# Patient Record
Sex: MALE | Race: WHITE | Employment: FULL TIME | URBAN - METROPOLITAN AREA
[De-identification: names, ages, dates, MRNs, and addresses within clinical notes are randomized per-mention and may not be internally consistent; named-entity substitution may affect disease eponyms.]

---

## 2017-02-20 ENCOUNTER — OFFICE VISIT (OUTPATIENT)
Dept: CARDIOLOGY CLINIC | Age: 62
End: 2017-02-20

## 2017-02-20 VITALS
SYSTOLIC BLOOD PRESSURE: 122 MMHG | WEIGHT: 216 LBS | HEART RATE: 66 BPM | BODY MASS INDEX: 30.92 KG/M2 | HEIGHT: 70 IN | DIASTOLIC BLOOD PRESSURE: 76 MMHG

## 2017-02-20 DIAGNOSIS — I25.10 CORONARY ARTERY DISEASE INVOLVING NATIVE CORONARY ARTERY OF NATIVE HEART WITHOUT ANGINA PECTORIS: Primary | ICD-10-CM

## 2017-02-20 DIAGNOSIS — I21.02 ACUTE ST ELEVATION MYOCARDIAL INFARCTION (STEMI) INVOLVING LEFT ANTERIOR DESCENDING (LAD) CORONARY ARTERY (HCC): ICD-10-CM

## 2017-02-20 DIAGNOSIS — E78.2 MIXED HYPERLIPIDEMIA: ICD-10-CM

## 2017-02-20 DIAGNOSIS — I10 ESSENTIAL HYPERTENSION: ICD-10-CM

## 2017-02-20 DIAGNOSIS — Z98.61 HISTORY OF PTCA: ICD-10-CM

## 2017-02-20 DIAGNOSIS — I15.9 SECONDARY HYPERTENSION, UNSPECIFIED: ICD-10-CM

## 2017-02-20 PROCEDURE — 99213 OFFICE O/P EST LOW 20 MIN: CPT | Performed by: INTERNAL MEDICINE

## 2017-02-20 RX ORDER — CARVEDILOL 6.25 MG/1
6.25 TABLET ORAL 2 TIMES DAILY WITH MEALS
Qty: 180 TABLET | Refills: 3 | Status: SHIPPED | OUTPATIENT
Start: 2017-02-20 | End: 2017-09-18 | Stop reason: SDUPTHER

## 2017-02-20 RX ORDER — LISINOPRIL 2.5 MG/1
2.5 TABLET ORAL DAILY
Qty: 90 TABLET | Refills: 3 | Status: SHIPPED | OUTPATIENT
Start: 2017-02-20 | End: 2017-09-18 | Stop reason: SDUPTHER

## 2017-02-20 RX ORDER — SIMVASTATIN 40 MG
40 TABLET ORAL NIGHTLY
Qty: 90 TABLET | Refills: 3 | Status: SHIPPED | OUTPATIENT
Start: 2017-02-20 | End: 2017-09-18 | Stop reason: SDUPTHER

## 2017-09-18 ENCOUNTER — OFFICE VISIT (OUTPATIENT)
Dept: CARDIOLOGY CLINIC | Age: 62
End: 2017-09-18

## 2017-09-18 VITALS
BODY MASS INDEX: 30.58 KG/M2 | DIASTOLIC BLOOD PRESSURE: 72 MMHG | HEIGHT: 70 IN | SYSTOLIC BLOOD PRESSURE: 126 MMHG | WEIGHT: 213.6 LBS | HEART RATE: 72 BPM

## 2017-09-18 DIAGNOSIS — I10 ESSENTIAL HYPERTENSION: ICD-10-CM

## 2017-09-18 DIAGNOSIS — E78.2 MIXED HYPERLIPIDEMIA: ICD-10-CM

## 2017-09-18 DIAGNOSIS — I25.10 CORONARY ARTERY DISEASE INVOLVING NATIVE CORONARY ARTERY OF NATIVE HEART WITHOUT ANGINA PECTORIS: Primary | ICD-10-CM

## 2017-09-18 DIAGNOSIS — Z98.61 HISTORY OF PTCA: ICD-10-CM

## 2017-09-18 PROCEDURE — 99213 OFFICE O/P EST LOW 20 MIN: CPT | Performed by: INTERNAL MEDICINE

## 2017-09-18 RX ORDER — ASPIRIN 325 MG
325 TABLET ORAL DAILY
Qty: 90 TABLET | Refills: 3 | Status: SHIPPED | OUTPATIENT
Start: 2017-09-18 | End: 2022-07-01

## 2017-09-18 RX ORDER — NITROGLYCERIN 0.4 MG/1
0.4 TABLET SUBLINGUAL EVERY 5 MIN PRN
Qty: 25 TABLET | Refills: 3 | Status: SHIPPED | OUTPATIENT
Start: 2017-09-18 | End: 2018-06-25 | Stop reason: SDUPTHER

## 2017-09-18 RX ORDER — CARVEDILOL 6.25 MG/1
6.25 TABLET ORAL 2 TIMES DAILY WITH MEALS
Qty: 180 TABLET | Refills: 3 | Status: SHIPPED | OUTPATIENT
Start: 2017-09-18 | End: 2018-06-25 | Stop reason: SDUPTHER

## 2017-09-18 RX ORDER — SIMVASTATIN 40 MG
40 TABLET ORAL NIGHTLY
Qty: 90 TABLET | Refills: 3 | Status: SHIPPED | OUTPATIENT
Start: 2017-09-18 | End: 2018-06-25 | Stop reason: SDUPTHER

## 2017-09-18 RX ORDER — LISINOPRIL 2.5 MG/1
2.5 TABLET ORAL DAILY
Qty: 90 TABLET | Refills: 3 | Status: SHIPPED | OUTPATIENT
Start: 2017-09-18 | End: 2018-06-25 | Stop reason: SDUPTHER

## 2017-12-04 ENCOUNTER — PROCEDURE VISIT (OUTPATIENT)
Dept: CARDIOLOGY CLINIC | Age: 62
End: 2017-12-04

## 2017-12-04 DIAGNOSIS — E78.1 PURE HYPERGLYCERIDEMIA: ICD-10-CM

## 2017-12-04 DIAGNOSIS — I25.10 CORONARY ARTERY DISEASE INVOLVING NATIVE CORONARY ARTERY OF NATIVE HEART WITHOUT ANGINA PECTORIS: ICD-10-CM

## 2017-12-04 DIAGNOSIS — Z98.61 HISTORY OF PTCA: ICD-10-CM

## 2017-12-04 DIAGNOSIS — I10 ESSENTIAL HYPERTENSION: ICD-10-CM

## 2017-12-04 LAB
LV EF: 46 %
LVEF MODALITY: NORMAL

## 2017-12-04 PROCEDURE — 93017 CV STRESS TEST TRACING ONLY: CPT | Performed by: INTERNAL MEDICINE

## 2017-12-04 PROCEDURE — 93018 CV STRESS TEST I&R ONLY: CPT | Performed by: INTERNAL MEDICINE

## 2017-12-04 PROCEDURE — 78452 HT MUSCLE IMAGE SPECT MULT: CPT | Performed by: INTERNAL MEDICINE

## 2017-12-04 PROCEDURE — 93016 CV STRESS TEST SUPVJ ONLY: CPT | Performed by: INTERNAL MEDICINE

## 2017-12-04 PROCEDURE — A9500 TC99M SESTAMIBI: HCPCS | Performed by: INTERNAL MEDICINE

## 2017-12-06 ENCOUNTER — TELEPHONE (OUTPATIENT)
Dept: CARDIOLOGY CLINIC | Age: 62
End: 2017-12-06

## 2017-12-06 NOTE — TELEPHONE ENCOUNTER
Dodson Left message on voicemail with date and time for appt and if pt  Could not make it to call and left her know             Multi vessel disease pattern. LAD territory MI & Inferior wall Ischemia. Large area of Myocardium involved. Isacc Shaw Apical dyskinesia with mildly reduced LV function. LVEF is 46 %.

## 2017-12-12 ENCOUNTER — OFFICE VISIT (OUTPATIENT)
Dept: CARDIOLOGY CLINIC | Age: 62
End: 2017-12-12

## 2017-12-12 VITALS
DIASTOLIC BLOOD PRESSURE: 84 MMHG | WEIGHT: 216 LBS | HEART RATE: 70 BPM | BODY MASS INDEX: 30.92 KG/M2 | SYSTOLIC BLOOD PRESSURE: 132 MMHG | HEIGHT: 70 IN

## 2017-12-12 DIAGNOSIS — I10 ESSENTIAL HYPERTENSION: ICD-10-CM

## 2017-12-12 DIAGNOSIS — E78.2 MIXED HYPERLIPIDEMIA: ICD-10-CM

## 2017-12-12 DIAGNOSIS — Z98.61 HISTORY OF PTCA: ICD-10-CM

## 2017-12-12 DIAGNOSIS — I21.02 ACUTE ST ELEVATION MYOCARDIAL INFARCTION (STEMI) INVOLVING LEFT ANTERIOR DESCENDING (LAD) CORONARY ARTERY (HCC): ICD-10-CM

## 2017-12-12 DIAGNOSIS — I25.10 CORONARY ARTERY DISEASE INVOLVING NATIVE CORONARY ARTERY OF NATIVE HEART WITHOUT ANGINA PECTORIS: Primary | ICD-10-CM

## 2017-12-12 DIAGNOSIS — E11.51 TYPE 2 DIABETES MELLITUS WITH DIABETIC PERIPHERAL ANGIOPATHY WITHOUT GANGRENE, WITHOUT LONG-TERM CURRENT USE OF INSULIN (HCC): ICD-10-CM

## 2017-12-12 PROCEDURE — 99214 OFFICE O/P EST MOD 30 MIN: CPT | Performed by: INTERNAL MEDICINE

## 2017-12-12 RX ORDER — METFORMIN HYDROCHLORIDE 500 MG/1
TABLET, EXTENDED RELEASE ORAL
Refills: 3 | COMMUNITY
Start: 2017-11-28 | End: 2018-06-25

## 2017-12-12 RX ORDER — SITAGLIPTIN 100 MG/1
TABLET, FILM COATED ORAL
Refills: 3 | COMMUNITY
Start: 2017-11-28 | End: 2018-06-25

## 2017-12-12 NOTE — ADDENDUM NOTE
Encounter addended by:  Keyona Soriano on: 12/12/2017 10:19 AM<BR>    Actions taken: Letter status changed

## 2017-12-12 NOTE — ADDENDUM NOTE
Encounter addended by:  Delbert Bojorquez on: 12/12/2017  9:54 AM<BR>    Actions taken: Letter status changed

## 2017-12-12 NOTE — LETTER
? Use of pathology  ? I authorize [facility] ([facility]) to dispose of tissues, specimens or organs when pathology is complete. ? Use of radiology  ? A contrast agent may be required for radiology procedures. My practitioner has advised me of the risks of using, risks of not using, benefits, side effects, and alternatives. ? Observers or use of photography, video/audio recording, or televising of the procedure(s). This is for medical, scientific, or educational purposes. This includes appropriate portions of my body. My identity will not be revealed. ? I consent to release of my social security number and other identifying information to [employer] (FDA), and the supplier/, if I receive tissue, a device, or implant. This is to track the tissue, device, or implant for defect, recall, infection, etc.     ? Use of blood and/or blood products, if needed, through my hospital stay. My practitioner has advised me of the risks of using, risks of not using, benefits, side effects, and alternatives. ___ I do NOT want Blood or Blood products given. (Complete separate  refusal form)    Code Status (jennifer one):  ___ I do NOT HAVE a DNR order. I am a Full-code.   I will receive CPR, intubation,  chest compressions, medications, and/or other life saving measures if I have a  cardiac or respiratory arrest.    ___ I have a Do Not Resuscitate (DNR)order.   (jennifer one below)  ___  I rescind my DNR for surgery and immediate post-operative period through Phase 2 recovery. This means, for that time period, I will be a Full-code and receive CPR, intubation, chest compressions, medications, and/or other life saving measures, if I have a cardiac or respiratory arrest.    ___ I WANT to keep my DNR in effect during my procedure(s) and immediate post-operative recovery period through Phase 2 recovery.   (Complete separate refusal form) This form has been fully explained to me. I understand its contents. Patients Signature: ___________________________Date: ________  Time: ________    If patient unable to sign, has engaged the [address] [place] [address], is a minor, or has a court-appointed Guardian:  [facility] Representative Name (Print):  ____________________________________      Relationship ([place] one):    Guardian   Parent    Spouse    HCPOA   Child   Sibling  Next-of-Kin Friend    Patients Representative Signature: _______________________________________              Date: ______________  Time: __________    An  was used.  name/ID: _________________________________      [facility] ([facility] Witness________________________  Date: ________   Time: _________    Physician/Practitioner _______________________  Date: ________   Time: _________           Revision 2017                                                                                                      Ladonna Dakins Dr. Chauncy Lions TO SCHEDULE:    LEFT HEART CATHETERIZATION WITH POSSIBLE PERCUTANEOUS CORONARY INTERVENTION       Patient Name: Prince Sales.    : 1955   MRN# [id number]    Home Phone Number: [phone]   Weight:    Wt Readings from Last 3 Encounters:   17 216 lb (98 kg)   17 213 lb 9.6 oz (96.9 kg)   17 216 lb (98 kg)        Insurance: Payor: Yuliana Camar[place] / Plan: Noe Diggs HMO / Product Type: *No Product type* /     Date of Procedure: 17 Time: 7am Arrival Time: 545am    Diagnosis:  Abn stress test  Allergies: No Known Allergies     1) Call [facility] scheduling ([phone]) or Instant Message  CONFIRMED WITH     PHONE OR   INSTANT MESSAGE  2) PREAUTHORIZATION NUMBER:    Spoke to:      From date:     expiration date:         [place]

## 2017-12-12 NOTE — PROGRESS NOTES
disease)     H/O cardiovascular stress test 05/29/12 [facility]    05/29/12 [facility] mild global hypokinesis with mildly reducednLV function LVEF is 42%    H/O cardiovascular stress test 1/5/2014    cardiolite-scarbasal anteroseptal,midanteroseptal,apical anterior,apical septal,apical,septal and apicial. EF44% no ischemia    H/O cardiovascular stress test 12/1/2015    cardiolite-scar,no ischemia,EF52%    H/O echocardiogram 06/24/11 06/24/11 aortic root dimensions are normal, left atrium is borderline enlarged, mid left ventricular hypertrophy seen with significant hypokinesis of the anterior wall of the septum and apical portions with moderate to severe reduced left ventric. systolic function, EF is 17-75% mitral valve appears normal aortic valve is sclerotic but not stenotic, tricuspid valve is normal,    History of cardiovascular stress test 07/11/11 07/11/11 good exercise capacity, physiological BP response to exercise, ETT non-diagnostic EKG abnormalties    History of cardiovascular stress test 12/04/2017    Multi vessel disease pattern. LAD territory MI & Inferior wall Ischemia. Large area of Myocardium involved. Em Salvia Apical dyskinesia with mildly reduced LV function. LVEF is 46 %.  History of complete electrocardiogram 07/11/11 07/11/11 on chart    History of PTCA 6/22/2011    HTN (hypertension)     Hyperlipidemia      Past Surgical History:   Procedure Laterality Date    PTCA  06/22/11 06/22/11 mild circumflex and RCA disease, mid critical LAD disease with total occlusion in it's mid portion, normal systemic , but elevated left ventricular end diastolic pressure at rest.left ventriculography reveals significant wall motion abnormalities. with at least moderate reduction of left ventricular systolic function. no mitral regurg, successful percutaneous intervention LAD artery      As reviewed History reviewed. No pertinent family history.   Social History   Substance Use Topics    Smoking status: Former Cranial nerves II through XII are grossly intact. There were no gross focal neurologic abnormalities. Lab Review   Lab Results   Component Value Date    CKTOTAL 84 07/14/2011    CKMB <0.2 07/14/2011    TROPONINI 0.020 07/15/2011     BNP:    Lab Results   Component Value Date     07/14/2011     PT/INR:    Lab Results   Component Value Date    INR 1.13 07/14/2011     Lab Results   Component Value Date    LABA1C 6.2 06/18/2012     Lab Results   Component Value Date    WBC 7.3 02/01/2016    HCT 47.4 02/01/2016    MCV 92.0 07/14/2011     02/01/2016     Lab Results   Component Value Date    CHOL 131 02/01/2016    TRIG 122 02/01/2016    HDL 32 (A) 02/01/2016    LDLCALC 75 02/01/2016    LDLDIRECT 113 (H) 06/23/2011     Lab Results   Component Value Date    ALT 29 08/05/2013    AST 21 08/05/2013     BMP:    Lab Results   Component Value Date     08/05/2013    K 4.8 08/05/2013     08/05/2013    CO2 22 08/05/2013    BUN 19 08/05/2013    CREATININE 1.01 08/05/2013     CMP:   Lab Results   Component Value Date     08/05/2013    K 4.8 08/05/2013     08/05/2013    CO2 22 08/05/2013    BUN 19 08/05/2013    PROT 6.4 07/14/2011     TSH:    Lab Results   Component Value Date    TSH 2.190 06/18/2012       QUALITY MEASURES REVIEWED:  1.CAD:Patient is taking anti platelet agent:Yes  2. DYSLIPIDEMIA: Patient is on cholesterol lowering medication:Yes  3. Beta-Blocker therapy for CAD, if prior Myocardial Infarction:Yes  4. Atrial fibrillation & anticoagulation therapy No  5. Discussed weight management strategies. Impression:    1. Coronary artery disease involving native coronary artery of native heart without angina pectoris    2. History of PTCA    3. Acute ST elevation myocardial infarction (STEMI) involving left anterior descending (LAD) coronary artery ([facility] [address]    4. Mixed hyperlipidemia    5. Essential hypertension    6.  Type 2 diabetes mellitus with diabetic peripheral angiopathy without gangrene, without long-term current use of insulin ([facility])       Patient Active Problem List   Diagnosis Code    CAD (coronary artery disease) I25.10    History of PTCA Z98.61    Acute MI I21.9    Hyperlipidemia E78.5    Hypertension I10    Type 2 diabetes mellitus with diabetic peripheral angiopathy without gangrene, without long-term current use of insulin ([facility] [address] E11.51       Assessment & Plan:    CAD:Yes   clinically stable. Patient is on optimal medical regimen ( see medication list above )  -     CORONARY ARTERY DISEASE: Patient is currently  asymptomatic from CAD. - changes in  treatment:   no           - Testing ordered:  yes, Cath ordered because of abnormal Cardiolite & DOT reasons. [facility] classification: 1  [place] RISK SCORE:  ARMANI RISK SCORE:  HTN:well controlled on current medical regimen, see list above. - changes in  treatment:   no   CARDIOMYOPATHY: None known   CONGESTIVE HEART FAILURE: NO KNOWN HISTORY.     VHD: No significant VHD noted  DYSLIPIDEMIA: Patient's profile is at / near Goal.yes,                                 HDL is low                                Tolerating current medical regimen wellyes,                                                           See most recent Lab values in Labs section above. Diabetes mellitis: .yes,                                      Managed by family MD                                     BS under good control no                                     Hgb A1c avilable no  OTHER RELEVANT DIAGNOSIS:as noted in patient's active problem list:  TESTS ORDERED: Left heart Cath                                       All previously ordered tests reviewed. ARRHYTHMIAS: None known   MEDICATIONS: CPM   Office f/u in six months. Primary/secondary prevention is the goal by aggressive risk modification, healthy and therapeutic life style changes for cardiovascular risk reduction.  Various goals are discussed and multiple questions answered.

## 2017-12-14 ENCOUNTER — HOSPITAL ENCOUNTER (OUTPATIENT)
Dept: GENERAL RADIOLOGY | Age: 62
Discharge: HOME | End: 2017-12-14
Attending: INTERNAL MEDICINE | Admitting: INTERNAL MEDICINE

## 2017-12-14 DIAGNOSIS — Z01.811 PRE-OP CHEST EXAM: ICD-10-CM

## 2017-12-14 LAB
ANION GAP SERPL CALCULATED.3IONS-SCNC: 14 MMOL/L (ref 4–16)
APTT: 23.8 SECONDS (ref 21.2–33)
BUN BLDV-MCNC: 10 MG/DL (ref 6–23)
CALCIUM SERPL-MCNC: 9.9 MG/DL (ref 8.3–10.6)
CHLORIDE BLD-SCNC: 104 MMOL/L (ref 99–110)
CO2: 23 MMOL/L (ref 21–32)
CREAT SERPL-MCNC: 0.8 MG/DL (ref 0.9–1.3)
GFR AFRICAN AMERICAN: >60 ML/MIN/1.73M2
GFR NON-AFRICAN AMERICAN: >60 ML/MIN/1.73M2
GLUCOSE BLD-MCNC: 131 MG/DL (ref 70–99)
HCT VFR BLD CALC: 48.7 % (ref 42–52)
HEMOGLOBIN: 16.2 GM/DL (ref 13.5–18)
INR BLD: 0.96 INDEX
MCH RBC QN AUTO: 30.2 PG (ref 27–31)
MCHC RBC AUTO-ENTMCNC: 33.3 % (ref 32–36)
MCV RBC AUTO: 90.9 FL (ref 78–100)
PDW BLD-RTO: 12.6 % (ref 11.7–14.9)
PLATELET # BLD: 291 K/CU MM (ref 140–440)
PMV BLD AUTO: 10.4 FL (ref 7.5–11.1)
POTASSIUM SERPL-SCNC: 4.8 MMOL/L (ref 3.5–5.1)
PROTHROMBIN TIME: 11 SECONDS (ref 9.12–12.5)
RBC # BLD: 5.36 M/CU MM (ref 4.6–6.2)
SODIUM BLD-SCNC: 141 MMOL/L (ref 135–145)
WBC # BLD: 8.6 K/CU MM (ref 4–10.5)

## 2017-12-15 ENCOUNTER — SURG/PROC ORDERS (OUTPATIENT)
Dept: CARDIOLOGY CLINIC | Age: 62
End: 2017-12-15

## 2017-12-15 PROBLEM — R94.39 ABNORMAL NUCLEAR STRESS TEST: Status: ACTIVE | Noted: 2017-12-15

## 2017-12-15 RX ORDER — SODIUM CHLORIDE 0.9 % (FLUSH) 0.9 %
10 SYRINGE (ML) INJECTION EVERY 12 HOURS SCHEDULED
Status: CANCELLED | OUTPATIENT
Start: 2017-12-15

## 2017-12-15 RX ORDER — SODIUM CHLORIDE 0.9 % (FLUSH) 0.9 %
10 SYRINGE (ML) INJECTION PRN
Status: CANCELLED | OUTPATIENT
Start: 2017-12-15

## 2017-12-15 RX ORDER — SODIUM CHLORIDE 9 MG/ML
INJECTION, SOLUTION INTRAVENOUS CONTINUOUS
Status: CANCELLED | OUTPATIENT
Start: 2017-12-15

## 2017-12-15 RX ORDER — DIAZEPAM 5 MG/1
5 TABLET ORAL
Status: CANCELLED | OUTPATIENT
Start: 2017-12-15 | End: 2017-12-15

## 2017-12-15 RX ORDER — DIPHENHYDRAMINE HCL 25 MG
25 TABLET ORAL
Status: CANCELLED | OUTPATIENT
Start: 2017-12-15 | End: 2017-12-15

## 2017-12-22 ENCOUNTER — OFFICE VISIT (OUTPATIENT)
Dept: CARDIOLOGY CLINIC | Age: 62
End: 2017-12-22

## 2017-12-22 VITALS
SYSTOLIC BLOOD PRESSURE: 110 MMHG | WEIGHT: 217.2 LBS | HEIGHT: 70 IN | HEART RATE: 74 BPM | BODY MASS INDEX: 31.09 KG/M2 | DIASTOLIC BLOOD PRESSURE: 80 MMHG

## 2017-12-22 DIAGNOSIS — E78.2 MIXED HYPERLIPIDEMIA: ICD-10-CM

## 2017-12-22 DIAGNOSIS — Z98.61 HISTORY OF PTCA: ICD-10-CM

## 2017-12-22 DIAGNOSIS — I10 ESSENTIAL HYPERTENSION: ICD-10-CM

## 2017-12-22 DIAGNOSIS — I21.02 ACUTE ST ELEVATION MYOCARDIAL INFARCTION (STEMI) INVOLVING LEFT ANTERIOR DESCENDING (LAD) CORONARY ARTERY (HCC): ICD-10-CM

## 2017-12-22 DIAGNOSIS — I25.10 CORONARY ARTERY DISEASE INVOLVING NATIVE CORONARY ARTERY OF NATIVE HEART WITHOUT ANGINA PECTORIS: Primary | ICD-10-CM

## 2017-12-22 DIAGNOSIS — E11.51 TYPE 2 DIABETES MELLITUS WITH DIABETIC PERIPHERAL ANGIOPATHY WITHOUT GANGRENE, WITHOUT LONG-TERM CURRENT USE OF INSULIN (HCC): ICD-10-CM

## 2017-12-22 PROCEDURE — 99214 OFFICE O/P EST MOD 30 MIN: CPT | Performed by: INTERNAL MEDICINE

## 2017-12-22 NOTE — PATIENT INSTRUCTIONS
CAD:Yes   clinically stable. Patient is on optimal medical regimen ( see medication list above )  -     CORONARY ARTERY DISEASE: Patient is currently  asymptomatic from CAD. - changes in  treatment:   no           - Testing ordered:  no  [facility] classification: 1  [place] RISK SCORE:  ARMANI RISK SCORE:  HTN:well controlled on current medical regimen, see list above. - changes in  treatment:   no   CARDIOMYOPATHY: None known   CONGESTIVE HEART FAILURE: NO KNOWN HISTORY.   VHD: No significant VHD noted  DYSLIPIDEMIA: Patient's profile is at / near Goal.yes,                                 HDL is low                                Tolerating current medical regimen wellyes,                                                               See most recent Lab values in Labs section above. Diabetes mellitis: .yes,                                      Managed by family MD                                     BS under good control yes,                                      Hgb A1c avilable yes,   OTHER RELEVANT DIAGNOSIS:as noted in patient's active problem list:  TESTS ORDERED: None this visit                                              All previously ordered tests reviewed. ARRHYTHMIAS: None known   MEDICATIONS: CPM   Office f/u in six months. Primary/secondary prevention is the goal by aggressive risk modification, healthy and therapeutic life style changes for cardiovascular risk reduction. Various goals are discussed and multiple questions answered.

## 2017-12-22 NOTE — PROGRESS NOTES
OFFICE PROGRESS NOTES      Allen Lozano is a 65 y.o. male who has    CHIEF COMPLAINT AS FOLLOWS:  CHEST PAIN: Patient denies any C/O chest pains at this time. SOB: No C/O SOB at this time. LEG EDEMA: No leg edema   PALPITATIONS: Denies any C/O Palpitations                                 DIZZINESS: No C/O Dizziness                           SYNCOPE: None   OTHER:                                     HPI: Patient is here for F/U on his CAD, HTN & Dyslipidemia problems. He does not have any complaints at this time. Sherral Hamman. has the following history recorded in care path:  Patient Active Problem List    Diagnosis Date Noted    Type 2 diabetes mellitus with diabetic peripheral angiopathy without gangrene, without long-term current use of insulin ([facility] [address] 12/12/2017     Priority: High    Hypertension 06/28/2013     Priority: Low    CAD (coronary artery disease)      Priority: Low    History of PTCA      Priority: Low    Acute MI      Priority: Low    Hyperlipidemia      Priority: Low    Abnormal nuclear stress test 12/15/2017     Current Outpatient Prescriptions   Medication Sig Dispense Refill    JANUVIA 100 MG tablet   3    metFORMIN (GLUCOPHAGE-XR) 500 MG extended release tablet   3    carvedilol (COREG) 6.25 MG tablet Take 1 tablet by mouth 2 times daily (with meals) 180 tablet 3    lisinopril (PRINIVIL;ZESTRIL) 2.5 MG tablet Take 1 tablet by mouth daily 90 tablet 3    simvastatin (ZOCOR) 40 MG tablet Take 1 tablet by mouth nightly 90 tablet 3    nitroGLYCERIN (NITROSTAT) 0.4 MG SL tablet Place 1 tablet under the tongue every 5 minutes as needed for Chest pain 25 tablet 3    aspirin 325 MG tablet Take 1 tablet by mouth daily 90 tablet 3     No current facility-administered medications for this visit. Allergies: Review of patient's allergies indicates no known allergies.   Past Medical History:   Diagnosis Date    Substance Use Topics    Smoking status: Former Smoker     Packs/day: 1.50     Years: 40.00     Quit date: 10/7/2012    Smokeless tobacco: Never Used    Alcohol use No      Comment: 1-2 pots of coffee a day      Review of Systems:    Constitutional: Negative for diaphoresis and fatigue  Psychological:Negative for anxiety or depression  HENT: Negative for headaches, nasal congestion, sinus pain or vertigo  Eyes: Negative for visual disturbance. Endocrine: Negative for polydipsia/polyuria  Respiratory: Negative for shortness of breath  Cardiovascular: Negative for chest pain, dyspnea on exertion, claudication, edema, irregular heartbeat, murmur, palpitations or shortness of breath  Gastrointestinal: Negative for abdominal pain or heartburn  Genito-Urinary: Negative for urinary frequency/urgency  Musculoskeletal: Negative for muscle pain, muscular weakness, negative for pain in arm and leg or swelling in foot and leg  Neurological: Negative for dizziness, headaches, memory loss, numbness/tingling, visual changes, syncope  Dermatological: Negative for rash    Objective:  /80   Pulse 74   Ht 5' 10\" (1.778 m)   Wt 217 lb 3.2 oz (98.5 kg)   BMI 31.16 kg/m²   Wt Readings from Last 3 Encounters:   12/22/17 217 lb 3.2 oz (98.5 kg)   12/15/17 216 lb (98 kg)   12/12/17 216 lb (98 kg)     Body mass index is 31.16 kg/m². GENERAL - Alert, oriented, pleasant, in no apparent distress. EYES: No jaundice, no conjunctival pallor. SKIN: It is warm & dry. No rashes. No Echhymosis    HEENT  No clinically significant abnormalities seen. Neck - Supple. No jugular venous distention noted. No carotid bruits. Cardiovascular  Normal S1 and S2 without obvious murmur or gallop. Extremities - No cyanosis, clubbing, or significant edema. Pulmonary  No respiratory distress. No wheezes or rales. Abdomen  No masses, tenderness, or organomegaly. Musculoskeletal  No significant edema. No joint deformities.  No 4. History of PTCA    5. Mixed hyperlipidemia    6. Essential hypertension       Patient Active Problem List   Diagnosis Code    CAD (coronary artery disease) I25.10    History of PTCA Z98.61    Acute MI I21.9    Hyperlipidemia E78.5    Hypertension I10    Type 2 diabetes mellitus with diabetic peripheral angiopathy without gangrene, without long-term current use of insulin (HCC) E11.51    Abnormal nuclear stress test R94.39       Assessment & Plan:    CAD:Yes   clinically stable. Patient is on optimal medical regimen ( see medication list above )  -     CORONARY ARTERY DISEASE: Patient is currently  asymptomatic from CAD. - changes in  treatment:   no           - Testing ordered:  no  [facility] classification: 1  [place] RISK SCORE:  ARMANI RISK SCORE:  HTN:well controlled on current medical regimen, see list above. - changes in  treatment:   no   CARDIOMYOPATHY: None known   CONGESTIVE HEART FAILURE: NO KNOWN HISTORY.   VHD: No significant VHD noted  DYSLIPIDEMIA: Patient's profile is at / near Goal.yes,                                 HDL is low                                Tolerating current medical regimen wellyes,                                                               See most recent Lab values in Labs section above. Diabetes mellitis: .yes,                                      Managed by family MD                                     BS under good control yes,                                      Hgb A1c avilable yes,   OTHER RELEVANT DIAGNOSIS:as noted in patient's active problem list:  TESTS ORDERED: None this visit                                              All previously ordered tests reviewed. ARRHYTHMIAS: None known   MEDICATIONS: CPM   Office f/u in six months. Primary/secondary prevention is the goal by aggressive risk modification, healthy and therapeutic life style changes for cardiovascular risk reduction.  Various goals are discussed and multiple questions answered.

## 2018-06-25 ENCOUNTER — OFFICE VISIT (OUTPATIENT)
Dept: CARDIOLOGY CLINIC | Age: 63
End: 2018-06-25

## 2018-06-25 VITALS
BODY MASS INDEX: 30.18 KG/M2 | DIASTOLIC BLOOD PRESSURE: 80 MMHG | WEIGHT: 210.8 LBS | HEIGHT: 70 IN | HEART RATE: 76 BPM | SYSTOLIC BLOOD PRESSURE: 122 MMHG

## 2018-06-25 DIAGNOSIS — I21.02 ACUTE ST ELEVATION MYOCARDIAL INFARCTION (STEMI) INVOLVING LEFT ANTERIOR DESCENDING (LAD) CORONARY ARTERY (HCC): ICD-10-CM

## 2018-06-25 DIAGNOSIS — Z98.61 HISTORY OF PTCA: ICD-10-CM

## 2018-06-25 DIAGNOSIS — E78.2 MIXED HYPERLIPIDEMIA: ICD-10-CM

## 2018-06-25 DIAGNOSIS — I10 ESSENTIAL HYPERTENSION: ICD-10-CM

## 2018-06-25 DIAGNOSIS — E11.51 TYPE 2 DIABETES MELLITUS WITH DIABETIC PERIPHERAL ANGIOPATHY WITHOUT GANGRENE, WITHOUT LONG-TERM CURRENT USE OF INSULIN (HCC): ICD-10-CM

## 2018-06-25 DIAGNOSIS — I25.10 CORONARY ARTERY DISEASE INVOLVING NATIVE CORONARY ARTERY OF NATIVE HEART WITHOUT ANGINA PECTORIS: Primary | ICD-10-CM

## 2018-06-25 PROCEDURE — 99214 OFFICE O/P EST MOD 30 MIN: CPT | Performed by: INTERNAL MEDICINE

## 2018-06-25 RX ORDER — SIMVASTATIN 40 MG
40 TABLET ORAL NIGHTLY
Qty: 90 TABLET | Refills: 3 | Status: SHIPPED | OUTPATIENT
Start: 2018-06-25

## 2018-06-25 RX ORDER — SIMVASTATIN 40 MG
40 TABLET ORAL NIGHTLY
Qty: 90 TABLET | Refills: 3 | Status: SHIPPED | OUTPATIENT
Start: 2018-06-25 | End: 2018-06-25 | Stop reason: SDUPTHER

## 2018-06-25 RX ORDER — CARVEDILOL 6.25 MG/1
6.25 TABLET ORAL 2 TIMES DAILY WITH MEALS
Qty: 180 TABLET | Refills: 3 | Status: SHIPPED | OUTPATIENT
Start: 2018-06-25 | End: 2022-07-15

## 2018-06-25 RX ORDER — CARVEDILOL 6.25 MG/1
6.25 TABLET ORAL 2 TIMES DAILY WITH MEALS
Qty: 180 TABLET | Refills: 3 | Status: SHIPPED | OUTPATIENT
Start: 2018-06-25 | End: 2018-06-25 | Stop reason: SDUPTHER

## 2018-06-25 RX ORDER — LISINOPRIL 2.5 MG/1
2.5 TABLET ORAL DAILY
Qty: 90 TABLET | Refills: 3 | Status: SHIPPED | OUTPATIENT
Start: 2018-06-25 | End: 2018-06-25 | Stop reason: SDUPTHER

## 2018-06-25 RX ORDER — LISINOPRIL 2.5 MG/1
2.5 TABLET ORAL DAILY
Qty: 90 TABLET | Refills: 3 | Status: SHIPPED | OUTPATIENT
Start: 2018-06-25 | End: 2022-07-15 | Stop reason: SDUPTHER

## 2018-06-25 RX ORDER — NITROGLYCERIN 0.4 MG/1
0.4 TABLET SUBLINGUAL EVERY 5 MIN PRN
Qty: 25 TABLET | Refills: 3 | Status: SHIPPED | OUTPATIENT
Start: 2018-06-25

## 2018-07-05 ENCOUNTER — PROCEDURE VISIT (OUTPATIENT)
Dept: CARDIOLOGY CLINIC | Age: 63
End: 2018-07-05

## 2018-07-05 DIAGNOSIS — I25.10 CORONARY ARTERY DISEASE INVOLVING NATIVE CORONARY ARTERY OF NATIVE HEART WITHOUT ANGINA PECTORIS: ICD-10-CM

## 2018-07-05 DIAGNOSIS — Z98.61 HISTORY OF PTCA: ICD-10-CM

## 2018-07-05 DIAGNOSIS — I21.02 ACUTE ST ELEVATION MYOCARDIAL INFARCTION (STEMI) INVOLVING LEFT ANTERIOR DESCENDING (LAD) CORONARY ARTERY (HCC): Primary | ICD-10-CM

## 2018-07-05 DIAGNOSIS — E11.51 TYPE 2 DIABETES MELLITUS WITH DIABETIC PERIPHERAL ANGIOPATHY WITHOUT GANGRENE, WITHOUT LONG-TERM CURRENT USE OF INSULIN (HCC): ICD-10-CM

## 2018-07-05 DIAGNOSIS — E78.2 MIXED HYPERLIPIDEMIA: ICD-10-CM

## 2018-07-05 DIAGNOSIS — I10 ESSENTIAL HYPERTENSION: ICD-10-CM

## 2018-07-05 LAB
LV EF: 43 %
LVEF MODALITY: NORMAL

## 2018-07-05 PROCEDURE — 93306 TTE W/DOPPLER COMPLETE: CPT | Performed by: INTERNAL MEDICINE

## 2018-07-06 ENCOUNTER — TELEPHONE (OUTPATIENT)
Dept: CARDIOLOGY CLINIC | Age: 63
End: 2018-07-06

## 2020-08-27 ENCOUNTER — OFFICE VISIT (OUTPATIENT)
Dept: CARDIOLOGY CLINIC | Age: 65
End: 2020-08-27
Payer: COMMERCIAL

## 2020-08-27 ENCOUNTER — TELEPHONE (OUTPATIENT)
Dept: CARDIOLOGY CLINIC | Age: 65
End: 2020-08-27

## 2020-08-27 ENCOUNTER — PROCEDURE VISIT (OUTPATIENT)
Dept: CARDIOLOGY CLINIC | Age: 65
End: 2020-08-27
Payer: COMMERCIAL

## 2020-08-27 VITALS
BODY MASS INDEX: 30.15 KG/M2 | HEART RATE: 94 BPM | DIASTOLIC BLOOD PRESSURE: 82 MMHG | WEIGHT: 210.6 LBS | SYSTOLIC BLOOD PRESSURE: 124 MMHG | HEIGHT: 70 IN

## 2020-08-27 LAB
LV EF: 43 %
LVEF MODALITY: NORMAL

## 2020-08-27 PROCEDURE — 93306 TTE W/DOPPLER COMPLETE: CPT | Performed by: INTERNAL MEDICINE

## 2020-08-27 PROCEDURE — 93000 ELECTROCARDIOGRAM COMPLETE: CPT | Performed by: INTERNAL MEDICINE

## 2020-08-27 PROCEDURE — 99204 OFFICE O/P NEW MOD 45 MIN: CPT | Performed by: INTERNAL MEDICINE

## 2020-08-27 RX ORDER — METFORMIN HYDROCHLORIDE 500 MG/1
TABLET, EXTENDED RELEASE ORAL
COMMUNITY
Start: 2020-06-15

## 2020-08-27 NOTE — LETTER
CLINICAL STAFF DOCUMENTATION    Omayra PATTERSON Elpidio Edmonds.  1955  [id number]       Pt here for DOT physical     Have you had any Chest Pain - No      Have you had any Shortness of Breath - No      Have you had any dizziness - No  If Yes DO ORTHOSTATIC BP - when do you feel dizzy     Have you had any palpitations - No  I      If Yes DO EKG    Do you have any edema - swelling in None    If Yes - CHECK TO SEE IF THE EDEMA IS PITTING    If Yes - Have they worn compression stockings No    Check Venous \"LEG PROBLEM Questionnaire\"    Do you have a surgery or procedure scheduled in the near future - No      Ask patient if they want to sign up for MyChart if they are not already signed up    Check to see if we have an E-MAIL on file for the patient    Check medication list thoroughly!!!  BE SURE TO ASK PATIENT IF THEY NEED MEDICATION REFILLS

## 2020-08-27 NOTE — PROGRESS NOTES
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 year old male  Who lives up with Dr. Lisa Kehr, patient has a history of hypertension hyperlipidemia coronary disease, acute MI status post PCI to proximal LAD in 2011, and angiogram since then in 2017 which was nonobstructive, stress MPI at [place] last year which showed large septal infarct with no ischemia, EF noted to be 38%. Patient is a  who was laid off due to [employer] 5 months ago and plans on resuming work. Patient went to [facility] for physical examination and declined to drive due to low EF on MPI. Patient has mild dyspnea at baseline, denies any chest pain. Denies any other complaints. Current Outpatient Medications   Medication Sig Dispense Refill    metFORMIN (GLUCOPHAGE-XR) 500 MG extended release tablet       empagliflozin (JARDIANCE) 25 MG tablet Take 25 mg by mouth every morning (before breakfast)      carvedilol (COREG) 6.25 MG tablet Take 1 tablet by mouth 2 times daily (with meals) 180 tablet 3    lisinopril (PRINIVIL;ZESTRIL) 2.5 MG tablet Take 1 tablet by mouth daily (Patient taking differently: Take 10 mg by mouth daily ) 90 tablet 3    nitroGLYCERIN (NITROSTAT) 0.4 MG SL tablet Place 1 tablet under the tongue every 5 minutes as needed for Chest pain 25 tablet 3    aspirin 325 MG tablet Take 1 tablet by mouth daily 90 tablet 3    simvastatin (ZOCOR) 40 MG tablet Take 1 tablet by mouth nightly (Patient not taking: Reported on 8/27/2020) 90 tablet 3     No current facility-administered medications for this visit. Allergies:     Patient has no known allergies.     Patient History:    Past Medical History:   Diagnosis Date    Acute MI ([facility] [address] 6/2011    CAD (coronary artery disease)     H/O cardiovascular stress test 05/29/12 [facility]    05/29/12 [facility] mild global hypokinesis with mildly reducednLV function LVEF is 42%    H/O cardiovascular stress test 1/5/2014    cardiolite-scarbasal anteroseptal,midanteroseptal,apical anterior,apical septal,apical,septal and apicial. EF44% no ischemia    H/O cardiovascular stress test 12/1/2015    cardiolite-scar,no ischemia,EF52%    H/O echocardiogram 06/24/11 06/24/11 aortic root dimensions are normal, left atrium is borderline enlarged, mid left ventricular hypertrophy seen with significant hypokinesis of the anterior wall of the septum and apical portions with moderate to severe reduced left ventric. systolic function, EF is 46-24% mitral valve appears normal aortic valve is sclerotic but not stenotic, tricuspid valve is normal,    History of cardiovascular stress test 07/11/11 07/11/11 good exercise capacity, physiological BP response to exercise, ETT non-diagnostic EKG abnormalties    History of cardiovascular stress test 12/04/2017    Multi vessel disease pattern. LAD territory MI & Inferior wall Ischemia. Large area of Myocardium involved. Deboraha Buys Apical dyskinesia with mildly reduced LV function. LVEF is 46 %.  History of complete electrocardiogram 07/11/11 07/11/11 on chart    History of PTCA 6/22/2011    HTN (hypertension)     Hx of Doppler echocardiogram 07/05/2018    Left ventricular systolic function is mildly depressed with an ejection fraction of 40-45%. Regional wall motion abnormalities in the form of Apical cap and mid anteroseptal akinesis noted.  Hyperlipidemia      Past Surgical History:   Procedure Laterality Date    PTCA  06/22/11 06/22/11 mild circumflex and RCA disease, mid critical LAD disease with total occlusion in it's mid portion, normal systemic , but elevated left ventricular end diastolic pressure at rest.left ventriculography reveals significant wall motion abnormalities. with at least moderate reduction of left ventricular systolic function.  no mitral regurg, successful percutaneous intervention LAD artery     History reviewed. No pertinent family history. Social History     Tobacco Use    Smoking status: Former Smoker     Packs/day: 1.50     Years: 40.00     Pack years: 60.00     Last attempt to quit: 10/7/2012     Years since quittin.8    Smokeless tobacco: Never Used   Substance Use Topics    Alcohol use: No     Alcohol/week: 0.0 standard drinks     Comment: 1-2 pots of coffee a day        Review of Systems:     · Constitutional:  No Fever or Weight Loss   · Eyes: No Decreased Vision  · ENT: No Headaches, Hearing Loss or Vertigo  · Cardiovascular no Chest pain mild shortness of breath, no Palpitations. No Edema   · Respiratory: No cough or wheezing . No Respiratory distress   · Gastrointestinal: No abdominal pain, appetite loss, blood in stools, constipation, diarrhea or heartburn  · Genitourinary: No dysuria, trouble voiding, or hematuria  · Musculoskeletal:  denies any new  joint aches , or pain   · Integumentary: No rash or pruritis  · Neurological: No TIA or stroke symptoms  · Psychiatric: No anxiety or depression  · Endocrine: No malaise, fatigue or temperature intolerance  · Hematologic/Lymphatic: No bleeding problems, blood clots or swollen lymph nodes  · Allergic/Immunologic: No nasal congestion or hives        Objective:      Physical Exam:    /82   Pulse 94   Ht 5' 10\" (1.778 m)   Wt 210 lb 9.6 oz (95.5 kg)   BMI 30.22 kg/m²   Wt Readings from Last 3 Encounters:   20 210 lb 9.6 oz (95.5 kg)   18 210 lb 12.8 oz (95.6 kg)   17 217 lb 3.2 oz (98.5 kg)     Body mass index is 30.22 kg/m². Vitals:    20 1258   BP: 124/82   Pulse: 94        General Appearance and Constitutional: Conversant, Well developed, Well nourished, No acute distress, Non-toxic appearance.    HEENT:  Normocephalic, Atraumatic, Bilateral external ears normal, Oropharynx moist, No oral exudates,   Nose normal.   Neck- Normal range of motion, No tenderness, Supple  Eyes:  EOMI, Conjunctiva normal, No discharge. Respiratory:  Normal breath sounds, No respiratory distress, No wheezing, No Rales, No Ronchi. No chest tenderness. Cardiovascular: S1-S2, no added heart sounds, No Mumurs appreciated. No gallops, rubs. No Pedal Edema   GI:  Bowel sounds normal, Soft, No tenderness,  :  No costovertebral angle tenderness   Musculoskeletal:  No gross deformities. Back- No tenderness  Integument:  Well hydrated, no rash   Lymphatic:  No lymphadenopathy noted   Neurologic:  Alert & oriented x 3, Normal motor function, normal sensory function, no focal deficits noted   Psychiatric:  Speech and behavior appropriate       Medical decision making and Data review:    DATA:    Lab Results   Component Value Date    LABA1C 6.2 06/18/2012     Lab Results   Component Value Date    CHOL 131 02/01/2016    TRIG 122 02/01/2016    HDL 32 (A) 02/01/2016    LDLCALC 75 02/01/2016    LDLDIRECT 113 (H) 06/23/2011     Lab Results   Component Value Date    WBC 8.6 12/14/2017    HGB 16.2 12/14/2017    HCT 48.7 12/14/2017    MCV 90.9 12/14/2017     12/14/2017     TSH:   Lab Results   Component Value Date    TSH 2.190 06/18/2012     Lab Results   Component Value Date    AST 21 08/05/2013    ALT 29 08/05/2013    BILITOT 0.3 08/05/2013    ALKPHOS 62 08/05/2013       All labs, medications and tests reviewed by myself including data and history from outside source , patient and available family . Left heart cardiac catheterization from 2017 reviewed personally  Patent stent in proximal LAD, otherwise nonobstructive CAD    EKG today   in the office shows normal sinus rhythm with no significant ST-T changes, old anterior septal infarct noted with Q waves in anterior septal leads    Impression and Plan:      1. CAD, history of MI status post PCI to LAD 2011 by Dr. Liat Franco. Stable CAD continue with aspirin statins beta-blocker.   2. Mild dyspnea, with reported low EF on MPI at [facility]

## 2020-08-27 NOTE — TELEPHONE ENCOUNTER
Tammie Finnegan at [address] [facility] called to let us know that they wanted an echo but the auth was denied.

## 2020-08-28 ENCOUNTER — TELEPHONE (OUTPATIENT)
Dept: CARDIOLOGY CLINIC | Age: 65
End: 2020-08-28

## 2020-08-28 NOTE — TELEPHONE ENCOUNTER
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 Please call pt to let him know when the results will be ready to print.

## 2022-07-01 ENCOUNTER — OFFICE VISIT (OUTPATIENT)
Dept: CARDIOLOGY CLINIC | Age: 67
End: 2022-07-01
Payer: MEDICARE

## 2022-07-01 VITALS
DIASTOLIC BLOOD PRESSURE: 78 MMHG | BODY MASS INDEX: 27 KG/M2 | SYSTOLIC BLOOD PRESSURE: 118 MMHG | HEART RATE: 91 BPM | HEIGHT: 70 IN | WEIGHT: 188.6 LBS

## 2022-07-01 DIAGNOSIS — E78.2 MIXED HYPERLIPIDEMIA: ICD-10-CM

## 2022-07-01 DIAGNOSIS — I25.10 CORONARY ARTERY DISEASE INVOLVING NATIVE CORONARY ARTERY OF NATIVE HEART WITHOUT ANGINA PECTORIS: Primary | ICD-10-CM

## 2022-07-01 DIAGNOSIS — Z98.61 HISTORY OF PTCA: ICD-10-CM

## 2022-07-01 DIAGNOSIS — I10 PRIMARY HYPERTENSION: ICD-10-CM

## 2022-07-01 DIAGNOSIS — E11.51 TYPE 2 DIABETES MELLITUS WITH DIABETIC PERIPHERAL ANGIOPATHY WITHOUT GANGRENE, WITHOUT LONG-TERM CURRENT USE OF INSULIN (HCC): ICD-10-CM

## 2022-07-01 LAB
ALBUMIN SERPL-MCNC: 3.5 G/DL
ALP BLD-CCNC: 106 U/L
ALT SERPL-CCNC: 78 U/L
ANION GAP SERPL CALCULATED.3IONS-SCNC: 1.2 MMOL/L
AST SERPL-CCNC: 43 U/L
BASOPHILS ABSOLUTE: 0.1 /ΜL
BASOPHILS RELATIVE PERCENT: 2 %
BILIRUB SERPL-MCNC: 0.3 MG/DL (ref 0.1–1.4)
BUN BLDV-MCNC: 9 MG/DL
CALCIUM SERPL-MCNC: 9.4 MG/DL
CHLORIDE BLD-SCNC: 95 MMOL/L
CO2: 23 MMOL/L
CREAT SERPL-MCNC: 0.7 MG/DL
EOSINOPHILS ABSOLUTE: 0.1 /ΜL
EOSINOPHILS RELATIVE PERCENT: 1 %
GFR CALCULATED: 102
GLUCOSE BLD-MCNC: 204 MG/DL
HCT VFR BLD CALC: 40.2 % (ref 41–53)
HEMOGLOBIN: 13.5 G/DL (ref 13.5–17.5)
LYMPHOCYTES ABSOLUTE: 1.7 /ΜL
LYMPHOCYTES RELATIVE PERCENT: 18 %
MCH RBC QN AUTO: 29.2 PG
MCHC RBC AUTO-ENTMCNC: 33.6 G/DL
MCV RBC AUTO: 86.8 FL
MONOCYTES ABSOLUTE: 0.2 /ΜL
MONOCYTES RELATIVE PERCENT: 4 %
NEUTROPHILS ABSOLUTE: 3.8 /ΜL
NEUTROPHILS RELATIVE PERCENT: 60 %
PDW BLD-RTO: 13.1 %
PLATELET # BLD: ABNORMAL 10*3/UL
PMV BLD AUTO: ABNORMAL FL
POTASSIUM SERPL-SCNC: 4.9 MMOL/L
RBC # BLD: 4.63 10^6/ΜL
SODIUM BLD-SCNC: 133 MMOL/L
TOTAL PROTEIN: 6.4
WBC # BLD: 6 10^3/ML

## 2022-07-01 PROCEDURE — 93000 ELECTROCARDIOGRAM COMPLETE: CPT | Performed by: INTERNAL MEDICINE

## 2022-07-01 PROCEDURE — 1123F ACP DISCUSS/DSCN MKR DOCD: CPT | Performed by: INTERNAL MEDICINE

## 2022-07-01 PROCEDURE — 99215 OFFICE O/P EST HI 40 MIN: CPT | Performed by: INTERNAL MEDICINE

## 2022-07-01 RX ORDER — ASPIRIN 81 MG/1
81 TABLET ORAL DAILY
Qty: 30 TABLET | Refills: 5 | Status: SHIPPED | OUTPATIENT
Start: 2022-07-01

## 2022-07-01 NOTE — PATIENT INSTRUCTIONS
CORONARY ARTERY DISEASE:Yes known H/O MI   clinically stable. Patient is on optimal medical regimen ( see medication list above )  -     CORONARY ARTERY DISEASE: Patient is currently  asymptomatic from CAD. - changes in  treatment:   no           - Testing ordered:  no  [place] classification: 1  12/2017 CATH;   1. Mild residual coronary artery disease, including Lef Main. 2. LAD stent is widely patent   3.abnormal LV function with global WMA & apical akinesis. LVEF   is estimated at 35 to 40 %. ECHO 8/27/2020   Left ventricular systolic function is mildly depressed with an ejection   fraction of 40-45%. Grade I diastolic dysfunction. Mild concentric left ventricular hypertrophy. Akinetic motion of the apical cap, apical lateral, and apical inferior wall   segments, as well as hypokinetic motion of the mid anteroseptal wall noted   in the left ventricle. No significant valvular disease noted. No evidence of pericardial effusion. When this echo is compared with the previous echo, no significant changes   have occurred. EKG; NSR, PRWP, LAD. HTN:well controlled on current medical regimen, see list above. - changes in  treatment:   no   CARDIOMYOPATHY: None known   CONGESTIVE HEART FAILURE: NO KNOWN HISTORY.      VHD: No significant VHD noted  DYSLIPIDEMIA: Patient's profile is at / near Goal.yes,                                 HDL is low                                Tolerating current medical regimen wellyes,                                 See most recent Lab values in Labs section above. Diabetes mellitis: .yes,                                      Managed by family MD                                     BS under good control yes,                                      Hgb A1c avilable yes,    ARRHYTHMIAS: Recent episode of a-fib. Need records from [facility]                                Will restart patient on Eliquis.   Atrial Fibrillation CHADSVASC2 Score Stroke Risk:   78 y.o. 74-69 - 1   male Male - 0   CHF HX: No - 0   HTN HX: Yes - 1   Stroke/TIA/Thromboembolism No - 0   Vascular Disease HX: Yes - 1   Diabetes Mellitus Yes - 1   CHADSVASC 2 Score 4      Annual Stroke Risk 4.8%- moderate-high        TESTS ORDERED: Stress Cardiolite, Echo & Lipid profile     PREVIOUSLY ORDERED TESTS REVIEWED & DISCUSSED WITH THE PATIENT:     I personally reviewed & interpreted, all previously ordered tests as copied above. Latest Labs are pulled in to the note with dates. Labs, specially in Reference to Lipid profile, Cardiac testing in the form of Echo ( dated: ), stress tests ( dated: ) & other relevant cardiac testing reviewed with patient & recommendations made based on assessment of the results. Discussed role of Cardiac risk factors & effects + treatment of co morbidities with patient & advised accordingly. MEDICATIONS: List of medications patient is currently taking is reviewed in detail with the patient & family member present. Discussed any side effects or problems taking the medication. Recommend Continue present management & medications as listed. AFFIRMATION: I  reviewed patient's history, previous & current medical problems & all Labs + testing. This includes chart prep even prior to the vosit. Various goals are discussed and multiple questions answered. Relevant concelling performed. Office follow up in 6 months if testing is OK. Please hold on to these instructions the  will call you within 1-9 business days when we receive authorization from your insurance. Nuclear Stress Test    WHAT TO EXPECT:   ? You will need to confirm the test or it could be cancelled. ? This test will take approximately 2 hours: 1 hour in the AM &    1 hour in the PM. You will be given a time by the Technologist after the first part is completed to come back. ?  You will be given a medication, through an IV in the hand, this will safely simulate exercise. This IV is also needed to inject the radioactive isotope unless you are able toe walk the treadmill. ? You will receive an injection in the AM & PM before the pictures. ? Using a special camera, you will have one set of pictures of your heart taken in the AM and a set of pictures in the PM.     PREPARATION FOR TEST:  ? Eat a light breakfast such as water or juice and toast.  ? If you are DIABETIC: Eat a normal breakfast with NO CAFFEINE and take your insulin as normal.   ? AVOID ALL FOODS & DRINKS containing CAFFEINE 12 HOURS PRIOR TO THE TEST: Including coffee, Tea, Petey and other soft drinks even those labeled  caffeine free or decaffeinated.  ? HOLD THESE MEDICATIONS Persantine & Theophylline (Theodur)  24 hours prior & bring your inhaler with you. Please hold on to these instructions the  will call you within 1-9 business days when we receive authorization from your insurance. Echocardiogram    WHAT TO EXPECT:   ? This test will take approximately 45 minutes. ? It is an ultrasound of the heart. ? It can look at the valves and chambers inside the heart   ? There is no special instructions for this test.     If you are unable to keep this appointment, please notify us 24 hours prior to test at [phone].

## 2022-07-01 NOTE — PROGRESS NOTES
OFFICE PROGRESS NOTES      Michelle Mcdowell is a 78 y.o. male who has    CHIEF COMPLAINT AS FOLLOWS:  CHEST: Patient denies any C/O chest pains at this time. SOB: No C/O SOB at this time. LEG EDEMA: No leg edema   PALPITATIONS: Denies any C/O Palpitations                                   DIZZINESS: No C/O Dizziness    SYNCOPE: None   OTHER/ ADDITIONAL COMPLAINTS: Patient says he was diagnosed with A-fib in [facility] last month but patient stopped taking Eliquis as soon as samples were finished                                    HPI: Patient is here for F/U on his CAD, A-fib, HTN & Dyslipidemia problems. CAD: Patient has known CAD. Had angioplasty in the past.  PTCA   06/22/11 06/22/11 mild circumflex and RCA disease, mid critical LAD disease with total occlusion in it's mid portion, normal systemic , but elevated left ventricular end diastolic pressure at rest.left ventriculography reveals significant wall motion abnormalities. with at least moderate reduction of left ventricular systolic function. no mitral regurg, successful percutaneous intervention LAD artery     HTN: Patient has known essential HTN. Has been treated with guideline recommended medical / physical/ diet therapy as stated below. Dyslipidemia: Patient has known mixed dyslipidemia. Has been treated with guideline recommended medical / physical/ diet therapy as stated below.                 Current Outpatient Medications   Medication Sig Dispense Refill    metFORMIN (GLUCOPHAGE-XR) 500 MG extended release tablet       empagliflozin (JARDIANCE) 25 MG tablet Take 25 mg by mouth every morning (before breakfast)      lisinopril (PRINIVIL;ZESTRIL) 2.5 MG tablet Take 1 tablet by mouth daily (Patient taking differently: Take 10 mg by mouth daily ) 90 tablet 3    simvastatin (ZOCOR) 40 MG tablet Take 1 tablet by mouth nightly 90 tablet 3    nitroGLYCERIN (NITROSTAT) 0.4 MG SL tablet Place 1 tablet under the tongue every 5 minutes as needed for Chest pain 25 tablet 3    aspirin 325 MG tablet Take 1 tablet by mouth daily 90 tablet 3    carvedilol (COREG) 6.25 MG tablet Take 1 tablet by mouth 2 times daily (with meals) 180 tablet 3     No current facility-administered medications for this visit. Allergies: Patient has no known allergies. Review of Systems:    Constitutional: Negative for diaphoresis and fatigue  Respiratory: Negative for shortness of breath  Cardiovascular: Negative for chest pain, dyspnea on exertion, claudication, edema, irregular heartbeat, murmur, palpitations or shortness of breath  Musculoskeletal: Negative for muscle pain, muscular weakness, negative for pain in arm and leg or swelling in foot and leg    Objective:  /78   Pulse 91   Ht 5' 10\" (1.778 m)   Wt 188 lb 9.6 oz (85.5 kg)   BMI 27.06 kg/m²   Wt Readings from Last 3 Encounters:   07/01/22 188 lb 9.6 oz (85.5 kg)   08/27/20 210 lb 9.6 oz (95.5 kg)   06/25/18 210 lb 12.8 oz (95.6 kg)     Body mass index is 27.06 kg/m². GENERAL - Alert, oriented, pleasant, in no apparent distress. EYES: No jaundice, no conjunctival pallor. Neck - Supple. No jugular venous distention noted. No carotid bruits. Cardiovascular - Normal S1 and S2 without obvious murmur or gallop. Extremities - No cyanosis, clubbing, or significant edema. Pulmonary - No respiratory distress. No wheezes or rales.       MEDICAL DECISION MAKING & DATA REVIEW:    Lab Review   No results found for: TROPONINT  Lab Results   Component Value Date/Time     07/14/2011 08:25 PM    BNP 4 06/22/2011 05:10 PM     Lab Results   Component Value Date    INR 0.96 12/14/2017    INR 1.13 07/14/2011     Lab Results   Component Value Date    LABA1C 6.2 06/18/2012     Lab Results   Component Value Date    WBC 8.6 12/14/2017    WBC 7.3 02/01/2016    HCT 48.7 12/14/2017    HCT 47.4 02/01/2016    MCV 90.9 12/14/2017    MCV 92.0 07/14/2011     12/14/2017     02/01/2016     Lab Results Component Value Date    CHOL 131 02/01/2016    CHOL 151 08/05/2013    TRIG 122 02/01/2016    TRIG 157 08/05/2013    HDL 32 (A) 02/01/2016    HDL 39 08/05/2013    LDLCALC 75 02/01/2016    LDLCALC 81 08/05/2013    LDLDIRECT 113 (H) 06/23/2011     Lab Results   Component Value Date    ALT 29 08/05/2013    ALT 31 06/18/2012    AST 21 08/05/2013    AST 20 06/18/2012     BMP:    Lab Results   Component Value Date/Time     12/14/2017 12:25 PM     08/05/2013 12:00 AM    K 4.8 12/14/2017 12:25 PM    K 4.8 08/05/2013 12:00 AM     12/14/2017 12:25 PM     08/05/2013 12:00 AM    CO2 23 12/14/2017 12:25 PM    CO2 22 08/05/2013 12:00 AM    BUN 10 12/14/2017 12:25 PM    BUN 19 08/05/2013 12:00 AM    CREATININE 0.8 12/14/2017 12:25 PM    CREATININE 1.01 08/05/2013 12:00 AM     CMP:   Lab Results   Component Value Date/Time     12/14/2017 12:25 PM     08/05/2013 12:00 AM    K 4.8 12/14/2017 12:25 PM    K 4.8 08/05/2013 12:00 AM     12/14/2017 12:25 PM     08/05/2013 12:00 AM    CO2 23 12/14/2017 12:25 PM    CO2 22 08/05/2013 12:00 AM    BUN 10 12/14/2017 12:25 PM    BUN 19 08/05/2013 12:00 AM    CREATININE 0.8 12/14/2017 12:25 PM    CREATININE 1.01 08/05/2013 12:00 AM    PROT 6.4 07/14/2011 08:25 PM     Lab Results   Component Value Date/Time    TSH 2.190 06/18/2012 12:00 AM     ECHO 8/27/2020   Left ventricular systolic function is mildly depressed with an ejection   fraction of 40-45%. Grade I diastolic dysfunction. Mild concentric left ventricular hypertrophy. Akinetic motion of the apical cap, apical lateral, and apical inferior wall   segments, as well as hypokinetic motion of the mid anteroseptal wall noted   in the left ventricle. No significant valvular disease noted. No evidence of pericardial effusion. When this echo is compared with the previous echo, no significant changes   have occurred.     QUALITY MEASURES REVIEWED:  1.CAD:Patient is taking anti platelet agent: Yes  2. DYSLIPIDEMIA: Patient is on cholesterol lowering medication:Yes   3. Beta-Blocker therapy for CAD, if prior Myocardial Infarction:Yes   4. Counselled regarding smoking cessation. No   Patient does not Smoke. 5.Anticoagulation therapy (for A.Fib) No   Does Not have A.Fib.  6.Discussed weight management strategies. Assessment & Plan:  Primary / Secondary prevention is the goal by aggressive risk modification, healthy and therapeutic life style changes for cardiovascular risk reduction. CORONARY ARTERY DISEASE:Yes known H/O MI   clinically stable. Patient is on optimal medical regimen ( see medication list above )  -     CORONARY ARTERY DISEASE: Patient is currently  asymptomatic from CAD. - changes in  treatment:   no           - Testing ordered:  no  [facility] classification: 1  12/2017 CATH;   1. Mild residual coronary artery disease, including Lef Main. 2. LAD stent is widely patent   3.abnormal LV function with global WMA & apical akinesis. LVEF   is estimated at 35 to 40 %. ECHO 8/27/2020   Left ventricular systolic function is mildly depressed with an ejection   fraction of 40-45%. Grade I diastolic dysfunction. Mild concentric left ventricular hypertrophy. Akinetic motion of the apical cap, apical lateral, and apical inferior wall   segments, as well as hypokinetic motion of the mid anteroseptal wall noted   in the left ventricle. No significant valvular disease noted. No evidence of pericardial effusion. When this echo is compared with the previous echo, no significant changes   have occurred. EKG; NSR, PRWP, LAD. HTN:well controlled on current medical regimen, see list above.             - changes in  treatment:   no   CARDIOMYOPATHY: None known   CONGESTIVE HEART FAILURE: NO KNOWN HISTORY.      VHD: No significant VHD noted  DYSLIPIDEMIA: Patient's profile is at / near Goal.yes,                                 HDL is low Tolerating current medical regimen wellyes,                                 See most recent Lab values in Labs section above. Diabetes mellitis: .yes,                                      Managed by family MD                                     BS under good control yes,                                      Hgb A1c avilable yes,    ARRHYTHMIAS: Recent episode of a-fib. Need records from [facility]                                Will restart patient on Eliquis. Atrial Fibrillation CHADSVASC2 Score Stroke Risk:   78 y.o. 74-69 - 1   male Male - 0   CHF HX: No - 0   HTN HX: Yes - 1   Stroke/TIA/Thromboembolism No - 0   Vascular Disease HX: Yes - 1   Diabetes Mellitus Yes - 1   CHADSVASC 2 Score 4      Annual Stroke Risk 4.8%- moderate-high        TESTS ORDERED: Stress Cardiolite, Echo & Lipid profile     PREVIOUSLY ORDERED TESTS REVIEWED & DISCUSSED WITH THE PATIENT:     I personally reviewed & interpreted, all previously ordered tests as copied above. Latest Labs are pulled in to the note with dates. Labs, specially in Reference to Lipid profile, Cardiac testing in the form of Echo ( dated: ), stress tests ( dated: ) & other relevant cardiac testing reviewed with patient & recommendations made based on assessment of the results. Discussed role of Cardiac risk factors & effects + treatment of co morbidities with patient & advised accordingly. MEDICATIONS: List of medications patient is currently taking is reviewed in detail with the patient & family member present. Discussed any side effects or problems taking the medication. Recommend Continue present management & medications as listed. AFFIRMATION: I  reviewed patient's history, previous & current medical problems & all Labs + testing. This includes chart prep even prior to the vosit. Various goals are discussed and multiple questions answered. Relevant concelling performed.      Office follow up in 6 months if testing is OK.

## 2022-07-01 NOTE — LETTER
[facility] [address]  [address] [place] [address]  Phone: [phone]  Fax: [phone]    Enoch Pond MD    July 1, 2022     Rainer Casper MD  [address] [id number]    Patient: Jamey Escamilla   MR Number: [id number]   YOB: 1955   Date of Visit: 7/1/2022       Dear Rainer Casper: Thank you for referring Christi Osborne to me for evaluation/treatment. Below are the relevant portions of my assessment and plan of care. If you have questions, please do not hesitate to call me. I look forward to following Doug Serrano along with you.     Sincerely,      Enoch Pond MD

## 2022-07-07 ENCOUNTER — PROCEDURE VISIT (OUTPATIENT)
Dept: CARDIOLOGY CLINIC | Age: 67
End: 2022-07-07
Payer: MEDICARE

## 2022-07-07 DIAGNOSIS — E78.2 MIXED HYPERLIPIDEMIA: ICD-10-CM

## 2022-07-07 DIAGNOSIS — Z98.61 HISTORY OF PTCA: ICD-10-CM

## 2022-07-07 DIAGNOSIS — I10 PRIMARY HYPERTENSION: ICD-10-CM

## 2022-07-07 DIAGNOSIS — I25.10 CORONARY ARTERY DISEASE INVOLVING NATIVE CORONARY ARTERY OF NATIVE HEART WITHOUT ANGINA PECTORIS: ICD-10-CM

## 2022-07-07 DIAGNOSIS — E11.51 TYPE 2 DIABETES MELLITUS WITH DIABETIC PERIPHERAL ANGIOPATHY WITHOUT GANGRENE, WITHOUT LONG-TERM CURRENT USE OF INSULIN (HCC): ICD-10-CM

## 2022-07-07 LAB
LV EF: 39 %
LVEF MODALITY: NORMAL

## 2022-07-07 PROCEDURE — 78452 HT MUSCLE IMAGE SPECT MULT: CPT | Performed by: INTERNAL MEDICINE

## 2022-07-07 PROCEDURE — A9500 TC99M SESTAMIBI: HCPCS | Performed by: INTERNAL MEDICINE

## 2022-07-07 PROCEDURE — 93015 CV STRESS TEST SUPVJ I&R: CPT | Performed by: INTERNAL MEDICINE

## 2022-07-08 ENCOUNTER — TELEPHONE (OUTPATIENT)
Dept: CARDIOLOGY CLINIC | Age: 67
End: 2022-07-08

## 2022-07-08 NOTE — TELEPHONE ENCOUNTER
Nm:  Abnormal study.    Large area of apical MI, involving bentley apical, infero apical regions &    entire Septum.    No significant reversible ischemia seen.    LV reveals inferior septal hypokinesis with moderately reduced LV function.  LVEF is 39 %.    Supervising physician Dr. Andrew Ornelas .      Patient verbally understood

## 2022-07-11 ENCOUNTER — HOSPITAL ENCOUNTER (OUTPATIENT)
Age: 67
Discharge: HOME | End: 2022-07-11
Payer: MEDICARE

## 2022-07-11 DIAGNOSIS — Z98.61 HISTORY OF PTCA: ICD-10-CM

## 2022-07-11 DIAGNOSIS — E78.2 MIXED HYPERLIPIDEMIA: ICD-10-CM

## 2022-07-11 DIAGNOSIS — I10 PRIMARY HYPERTENSION: ICD-10-CM

## 2022-07-11 DIAGNOSIS — I25.10 CORONARY ARTERY DISEASE INVOLVING NATIVE CORONARY ARTERY OF NATIVE HEART WITHOUT ANGINA PECTORIS: ICD-10-CM

## 2022-07-11 DIAGNOSIS — E11.51 TYPE 2 DIABETES MELLITUS WITH DIABETIC PERIPHERAL ANGIOPATHY WITHOUT GANGRENE, WITHOUT LONG-TERM CURRENT USE OF INSULIN (HCC): ICD-10-CM

## 2022-07-11 LAB
CHOLESTEROL, FASTING: 94 MG/DL
HDLC SERPL-MCNC: 27 MG/DL
LDL CHOLESTEROL CALCULATED: 30 MG/DL
TRIGLYCERIDE, FASTING: 185 MG/DL

## 2022-07-11 PROCEDURE — 80061 LIPID PANEL: CPT

## 2022-07-11 PROCEDURE — 36415 COLL VENOUS BLD VENIPUNCTURE: CPT

## 2022-07-14 ENCOUNTER — PROCEDURE VISIT (OUTPATIENT)
Dept: CARDIOLOGY CLINIC | Age: 67
End: 2022-07-14
Payer: MEDICARE

## 2022-07-14 DIAGNOSIS — I25.10 CORONARY ARTERY DISEASE INVOLVING NATIVE CORONARY ARTERY OF NATIVE HEART WITHOUT ANGINA PECTORIS: ICD-10-CM

## 2022-07-14 DIAGNOSIS — E78.2 MIXED HYPERLIPIDEMIA: ICD-10-CM

## 2022-07-14 DIAGNOSIS — Z98.61 HISTORY OF PTCA: ICD-10-CM

## 2022-07-14 DIAGNOSIS — R94.39 ABNORMAL NUCLEAR STRESS TEST: ICD-10-CM

## 2022-07-14 DIAGNOSIS — E11.51 TYPE 2 DIABETES MELLITUS WITH DIABETIC PERIPHERAL ANGIOPATHY WITHOUT GANGRENE, WITHOUT LONG-TERM CURRENT USE OF INSULIN (HCC): ICD-10-CM

## 2022-07-14 DIAGNOSIS — I10 PRIMARY HYPERTENSION: ICD-10-CM

## 2022-07-14 LAB
LV EF: 48 %
LVEF MODALITY: NORMAL

## 2022-07-14 PROCEDURE — 93306 TTE W/DOPPLER COMPLETE: CPT | Performed by: INTERNAL MEDICINE

## 2022-07-15 ENCOUNTER — OFFICE VISIT (OUTPATIENT)
Dept: CARDIOLOGY CLINIC | Age: 67
End: 2022-07-15
Payer: MEDICARE

## 2022-07-15 VITALS
DIASTOLIC BLOOD PRESSURE: 79 MMHG | WEIGHT: 185 LBS | SYSTOLIC BLOOD PRESSURE: 108 MMHG | HEIGHT: 70 IN | BODY MASS INDEX: 26.48 KG/M2 | HEART RATE: 92 BPM

## 2022-07-15 DIAGNOSIS — I10 ESSENTIAL HYPERTENSION: ICD-10-CM

## 2022-07-15 DIAGNOSIS — E78.2 MIXED HYPERLIPIDEMIA: ICD-10-CM

## 2022-07-15 DIAGNOSIS — E11.51 TYPE 2 DIABETES MELLITUS WITH DIABETIC PERIPHERAL ANGIOPATHY WITHOUT GANGRENE, WITHOUT LONG-TERM CURRENT USE OF INSULIN (HCC): ICD-10-CM

## 2022-07-15 DIAGNOSIS — I21.02 ACUTE ST ELEVATION MYOCARDIAL INFARCTION (STEMI) INVOLVING LEFT ANTERIOR DESCENDING (LAD) CORONARY ARTERY (HCC): ICD-10-CM

## 2022-07-15 DIAGNOSIS — I25.10 CORONARY ARTERY DISEASE INVOLVING NATIVE CORONARY ARTERY OF NATIVE HEART WITHOUT ANGINA PECTORIS: Primary | ICD-10-CM

## 2022-07-15 DIAGNOSIS — Z98.61 HISTORY OF PTCA: ICD-10-CM

## 2022-07-15 DIAGNOSIS — I10 PRIMARY HYPERTENSION: ICD-10-CM

## 2022-07-15 PROCEDURE — 1123F ACP DISCUSS/DSCN MKR DOCD: CPT | Performed by: INTERNAL MEDICINE

## 2022-07-15 PROCEDURE — 99213 OFFICE O/P EST LOW 20 MIN: CPT | Performed by: INTERNAL MEDICINE

## 2022-07-15 RX ORDER — SEMAGLUTIDE 1.34 MG/ML
INJECTION, SOLUTION SUBCUTANEOUS
COMMUNITY
Start: 2022-06-07

## 2022-07-15 RX ORDER — GABAPENTIN 100 MG/1
100 CAPSULE ORAL NIGHTLY
COMMUNITY

## 2022-07-15 RX ORDER — LISINOPRIL 10 MG/1
10 TABLET ORAL DAILY
Qty: 30 TABLET | Refills: 5 | Status: SHIPPED | OUTPATIENT
Start: 2022-07-15

## 2022-07-15 NOTE — PROGRESS NOTES
OFFICE PROGRESS NOTES      Roberto Cerrato is a 78 y.o. male who has    CHIEF COMPLAINT AS FOLLOWS:  CHEST PAIN: Patient denies any C/O chest pains at this time. SOB: No C/O SOB at this time. LEG EDEMA: No leg edema   PALPITATIONS: Denies any C/O Palpitations                                   DIZZINESS: No C/O Dizziness    SYNCOPE: None   OTHER/ ADDITIONAL COMPLAINTS:                                     HPI: Patient is here for F/U on his CAD,  Arrhythmia, HTN & Dyslipidemia problems. CAD: Patient has known CAD. Had angioplasty in the past.  Arrhythmia: Patient has known  PAF. HTN: Patient has known essential HTN. Has been treated with guideline recommended medical / physical/ diet therapy as stated below. Dyslipidemia: Patient has known mixed dyslipidemia. Has been treated with guideline recommended medical / physical/ diet therapy as stated below. Current Outpatient Medications   Medication Sig Dispense Refill    gabapentin (NEURONTIN) 100 MG capsule Take 100 mg by mouth at bedtime. apixaban (ELIQUIS) 5 MG TABS tablet Take 1 tablet by mouth 2 times daily 60 tablet 5    aspirin EC 81 MG EC tablet Take 1 tablet by mouth daily 30 tablet 5    metFORMIN (GLUCOPHAGE-XR) 500 MG extended release tablet       carvedilol (COREG) 6.25 MG tablet Take 1 tablet by mouth 2 times daily (with meals) 180 tablet 3    lisinopril (PRINIVIL;ZESTRIL) 2.5 MG tablet Take 1 tablet by mouth daily (Patient taking differently: Take 10 mg by mouth in the morning.) 90 tablet 3    simvastatin (ZOCOR) 40 MG tablet Take 1 tablet by mouth nightly 90 tablet 3    nitroGLYCERIN (NITROSTAT) 0.4 MG SL tablet Place 1 tablet under the tongue every 5 minutes as needed for Chest pain 25 tablet 3    OZEMPIC, 1 MG/DOSE, 4 MG/3ML SOPN INJECT 1ML ONCE A WEEK       No current facility-administered medications for this visit. Allergies: Patient has no known allergies.   Review of Systems:    Constitutional: Negative for diaphoresis and fatigue  Respiratory: Negative for shortness of breath  Cardiovascular: Negative for chest pain, dyspnea on exertion, claudication, edema, irregular heartbeat, murmur, palpitations or shortness of breath  Musculoskeletal: Negative for muscle pain, muscular weakness, negative for pain in arm and leg or swelling in foot and leg    Objective:  /79   Pulse 92   Ht 5' 10\" (1.778 m)   Wt 185 lb (83.9 kg)   BMI 26.54 kg/m²   Wt Readings from Last 3 Encounters:   07/15/22 185 lb (83.9 kg)   07/01/22 188 lb 9.6 oz (85.5 kg)   08/27/20 210 lb 9.6 oz (95.5 kg)     Body mass index is 26.54 kg/m². GENERAL - Alert, oriented, pleasant, in no apparent distress. EYES: No jaundice, no conjunctival pallor. Neck - Supple. No jugular venous distention noted. No carotid bruits. Cardiovascular - Normal S1 and S2 without obvious murmur or gallop. Extremities - No cyanosis, clubbing, or significant edema. Pulmonary - No respiratory distress. No wheezes or rales.       MEDICAL DECISION MAKING & DATA REVIEW:    Lab Review   No results found for: TROPONINT  Lab Results   Component Value Date/Time     07/14/2011 08:25 PM    BNP 4 06/22/2011 05:10 PM     Lab Results   Component Value Date    INR 0.96 12/14/2017    INR 1.13 07/14/2011     Lab Results   Component Value Date    LABA1C 6.2 06/18/2012     Lab Results   Component Value Date    WBC 6.0 07/01/2022    WBC 8.6 12/14/2017    HCT 40.2 (A) 07/01/2022    HCT 48.7 12/14/2017    MCV 86.8 07/01/2022    MCV 90.9 12/14/2017     12/14/2017     02/01/2016     Lab Results   Component Value Date    CHOL 131 02/01/2016    CHOL 151 08/05/2013    TRIG 122 02/01/2016    TRIG 157 08/05/2013    HDL 27 (L) 07/11/2022    HDL 32 (A) 02/01/2016    LDLCALC 30 07/11/2022    LDLCALC 75 02/01/2016    LDLDIRECT 113 (H) 06/23/2011     Lab Results   Component Value Date    ALT 78 07/01/2022    ALT 29 08/05/2013    AST 43 07/01/2022    AST 21 08/05/2013 BMP:    Lab Results   Component Value Date/Time     07/01/2022 12:00 AM     12/14/2017 12:25 PM    K 4.9 07/01/2022 12:00 AM    K 4.8 12/14/2017 12:25 PM    CL 95 07/01/2022 12:00 AM     12/14/2017 12:25 PM    CO2 23 07/01/2022 12:00 AM    CO2 23 12/14/2017 12:25 PM    BUN 9 07/01/2022 12:00 AM    BUN 10 12/14/2017 12:25 PM    CREATININE 0.7 07/01/2022 12:00 AM    CREATININE 0.8 12/14/2017 12:25 PM     CMP:   Lab Results   Component Value Date/Time     07/01/2022 12:00 AM     12/14/2017 12:25 PM    K 4.9 07/01/2022 12:00 AM    K 4.8 12/14/2017 12:25 PM    CL 95 07/01/2022 12:00 AM     12/14/2017 12:25 PM    CO2 23 07/01/2022 12:00 AM    CO2 23 12/14/2017 12:25 PM    BUN 9 07/01/2022 12:00 AM    BUN 10 12/14/2017 12:25 PM    CREATININE 0.7 07/01/2022 12:00 AM    CREATININE 0.8 12/14/2017 12:25 PM    PROT 6.4 07/14/2011 08:25 PM     Lab Results   Component Value Date/Time    TSH 2.190 06/18/2012 12:00 AM       QUALITY MEASURES REVIEWED:  1.CAD:Patient is taking anti platelet agent:Yes  2. DYSLIPIDEMIA: Patient is on cholesterol lowering medication:Yes   3. Beta-Blocker therapy for CAD, if prior Myocardial Infarction:Yes   4. Counselled regarding smoking cessation. No   Patient does not Smoke. 5.Anticoagulation therapy (for A.Fib) Yes   6. Discussed weight management strategies. Assessment & Plan:  Primary / Secondary prevention is the goal by aggressive risk modification, healthy and therapeutic life style changes for cardiovascular risk reduction. CORONARY ARTERY DISEASE:Yes known H/O MI   clinically stable. Patient is on optimal medical regimen ( see medication list above )  -     CORONARY ARTERY DISEASE: Patient is currently  asymptomatic from CAD. - changes in  treatment:   no, on ASA & Coreg.           - Testing ordered:  no  [place] classification: 1  12/2017 CATH;   1. Mild residual coronary artery disease, including Lef Main.    2. LAD stent is widely patent   3.abnormal LV function with global WMA & apical akinesis. LVEF   is estimated at 35 to 40 %. 7/1/2022   Abnormal study. Large area of apical MI, involving bentley apical, infero apical regions &    entire Septum. No significant reversible ischemia seen. LV reveals inferior septal hypokinesis with moderately reduced LV function. LVEF is 39 %. ECHO  7/14/2022   Left ventricular systolic function is mildly depressed with an ejection   fraction of 45-50%. Apical, mid and basal anteroseptal wall segments are   hypokinetic. Grade I diastolic dysfunction. No significant valvular disease noted. Normal pulmonary artery pressure. No evidence of pericardial effusion. Clinical correlation is recommended. Arrange OV  EKG; NSR, PRWP, LAD. HTN:well controlled on current medical regimen, see list above. - changes in  treatment:   no, on Lisinopril   CARDIOMYOPATHY: None known   CONGESTIVE HEART FAILURE: NO KNOWN HISTORY.      VHD: No significant VHD noted  DYSLIPIDEMIA: Patient's profile is at / near Goal.yes,                                 HDL is low                                Tolerating current medical regimen wellyes,takes Zocor                                 See most recent Lab values in Labs section above. Diabetes mellitis: .yes,                                      Managed by family MD                                     BS under good control yes,                                      Hgb A1c avilable yes,    ARRHYTHMIAS: Recent episode of a-fib. Need records from [facility]                                Will restart patient on Eliquis.   Atrial Fibrillation CHADSVASC2 Score Stroke Risk:   78 y.o. 74-69 - 1   male Male - 0   CHF HX: No - 0   HTN HX: Yes - 1   Stroke/TIA/Thromboembolism No - 0   Vascular Disease HX: Yes - 1   Diabetes Mellitus Yes - 1   CHADSVASC 2 Score 4      Annual Stroke Risk 4.8%- moderate-high TESTS ORDERED: none this visit     PREVIOUSLY ORDERED TESTS REVIEWED & DISCUSSED WITH THE PATIENT:     I personally reviewed & interpreted, all previously ordered tests as copied above. Latest Labs are pulled in to the note with dates. Labs, specially in Reference to Lipid profile, Cardiac testing in the form of Echo ( dated: ), stress tests ( dated: ) & other relevant cardiac testing reviewed with patient & recommendations made based on assessment of the results. Discussed role of Cardiac risk factors & effects + treatment of co morbidities with patient & advised accordingly. MEDICATIONS: List of medications patient is currently taking is reviewed in detail with the patient. Discussed any side effects or problems taking the medication. Recommend Continue present management & medications as listed. AFFIRMATION: I spent at least 20 minutes of time reviewing patient's history, previous & current medical problems & all Labs + testing. This includes chart prep even prior to the vosit. Various goals are discussed and multiple questions answered. Relevant concelling performed. Office follow up in six months.

## 2022-07-15 NOTE — LETTER
Patient Name: Diedra Simmonds. : 1955  MRN# [id number]    REASON FOR VISIT: {Blank single:[id number]::\"    Patient Active Problem List    Diagnosis Date Noted    Type 2 diabetes mellitus with diabetic peripheral angiopathy without gangrene, without long-term current use of insulin ([facility] 2017    Abnormal nuclear stress test 12/15/2017    Hypertension 2013    CAD (coronary artery disease)     History of PTCA     Acute MI ([facility]     Hyperlipidemia        Allergies: Patient has no known allergies. Current Outpatient Medications   Medication Sig Dispense Refill    apixaban (ELIQUIS) 5 MG TABS tablet Take 1 tablet by mouth 2 times daily 60 tablet 5    aspirin EC 81 MG EC tablet Take 1 tablet by mouth daily 30 tablet 5    metFORMIN (GLUCOPHAGE-XR) 500 MG extended release tablet       empagliflozin (JARDIANCE) 25 MG tablet Take 25 mg by mouth every morning (before breakfast)      carvedilol (COREG) 6.25 MG tablet Take 1 tablet by mouth 2 times daily (with meals) 180 tablet 3    lisinopril (PRINIVIL;ZESTRIL) 2.5 MG tablet Take 1 tablet by mouth daily (Patient taking differently: Take 10 mg by mouth daily ) 90 tablet 3    simvastatin (ZOCOR) 40 MG tablet Take 1 tablet by mouth nightly 90 tablet 3    nitroGLYCERIN (NITROSTAT) 0.4 MG SL tablet Place 1 tablet under the tongue every 5 minutes as needed for Chest pain 25 tablet 3     No current facility-administered medications for this visit.          Lab Review   Lab Results   Component Value Date/Time    CKTOTAL 84 2011 08:25 PM    CKTOTAL 2,092 2011 11:45 AM    CKMB <0.2 2011 08:25 PM    CKMB 210.5 2011 11:45 AM     BNP:    Lab Results   Component Value Date/Time     2011 08:25 PM    BNP 4 2011 05:10 PM     PT/INR:    Lab Results   Component Value Date    INR 0.96 2017    INR 1.13 2011     Lab Results   Component Value Date    LABA1C 6.2 2012     Lab Results   Component Value Date WBC 6.0 07/01/2022    WBC 8.6 12/14/2017    HCT 40.2 (A) 07/01/2022    HCT 48.7 12/14/2017    MCV 86.8 07/01/2022    MCV 90.9 12/14/2017     12/14/2017     02/01/2016     Lab Results   Component Value Date    CHOL 131 02/01/2016    CHOL 151 08/05/2013    TRIG 122 02/01/2016    TRIG 157 08/05/2013    HDL 27 (L) 07/11/2022    HDL 32 (A) 02/01/2016    LDLCALC 30 07/11/2022    LDLCALC 75 02/01/2016    LDLDIRECT 113 (H) 06/23/2011     Lab Results   Component Value Date    ALT 78 07/01/2022    ALT 29 08/05/2013    AST 43 07/01/2022    AST 21 08/05/2013     BMP:    Lab Results   Component Value Date/Time     07/01/2022 12:00 AM     12/14/2017 12:25 PM    K 4.9 07/01/2022 12:00 AM    K 4.8 12/14/2017 12:25 PM    CL 95 07/01/2022 12:00 AM     12/14/2017 12:25 PM    CO2 23 07/01/2022 12:00 AM    CO2 23 12/14/2017 12:25 PM    BUN 9 07/01/2022 12:00 AM    BUN 10 12/14/2017 12:25 PM    CREATININE 0.7 07/01/2022 12:00 AM    CREATININE 0.8 12/14/2017 12:25 PM     CMP:   Lab Results   Component Value Date/Time     07/01/2022 12:00 AM     12/14/2017 12:25 PM    K 4.9 07/01/2022 12:00 AM    K 4.8 12/14/2017 12:25 PM    CL 95 07/01/2022 12:00 AM     12/14/2017 12:25 PM    CO2 23 07/01/2022 12:00 AM    CO2 23 12/14/2017 12:25 PM    BUN 9 07/01/2022 12:00 AM    BUN 10 12/14/2017 12:25 PM    CREATININE 0.7 07/01/2022 12:00 AM    CREATININE 0.8 12/14/2017 12:25 PM    PROT 6.4 07/14/2011 08:25 PM     TSH:    Lab Results   Component Value Date/Time    TSH 2.190 06/18/2012 12:00 AM       Smoke: What:                           How much:    Alcohol:                                      How Much:     Caffeine: Pop:         Tea:            Coffee:                Chocolate:    Exercise:    Last Visit:  Complaints:  Changes:    LAST EKG: NONE    VENOUS DOPPLER: NONE    HOLTER/ EVENT MONITOR: NONE    STRESS TEST:  7/7/22  Abnormal study.    Large area of apical MI, involving bentley apical, infero apical regions &    entire Septum.    No significant reversible ischemia seen.    LV reveals inferior septal hypokinesis with moderately reduced LV function.  LVEF is 39 %. ECHO: 7/14/22  Left ventricular systolic function is mildly depressed with an ejection   fraction of 45-50%. Apical, mid and basal anteroseptal wall segments are   hypokinetic. Grade I diastolic dysfunction. No significant valvular disease noted. Normal pulmonary artery pressure. No evidence of pericardial effusion. Clinical correlation is recommended. Arrange OV  CAROTID: NONE    MUGA: NONE    LAST PACER CHECK: NONE    CARDIAC CATH: 12/2017  Procedure Summary   1. Mild residual coronary artery disease, including Lef Main. 2. LAD stent is widely patent   3.abnormal LV function with global WMA & apical akinesis. LVEF   is estimated at 35 to 40 %. Amio Protocol:    CHADS: [id number] Score for Atrial Fibrillation Stroke Risk   Risk   Factors  Component Value   C CHF No 0   H HTN Yes 1   A2 Age >= 76 No,  (69 y.o.) 0   D DM Yes 1   S2 Prior Stroke/TIA No 0   V Vascular Disease Yes 1   A Age 74-69 Yes,  (69 y.o.) 1   Sc Sex male 0    [id number]  Score  4   Score last updated 7/15/22 1:53 AM EDT    Click here for a link to the UpToDate guideline \"Atrial Fibrillation: Anticoagulation therapy to prevent embolization    Disclaimer: Risk Score calculation is dependent on accuracy of patient problem list and past encounter diagnosis.

## 2022-07-15 NOTE — PROGRESS NOTES
Vein \"LEG PROBLEM Questionnaire\"  Do you have prominent leg veins? No   Do you have any skin discoloration? No  Do you have any healed or active sores? No  Do you have swelling of the legs? No  Do you have a family history of varicose veins? No  Does your profession involve pro-longed        standing or heavy lifting? No  7. Have you been fighting overweight problems? No  8. Do you have restless legs? Yes  9. Do you have any night time cramps? Yes  10. Do you have any of the following in your legs:         I  11. If Yes - Have they worn compression stockings No  12.  If they have worn compression stockings

## 2022-09-05 NOTE — PATIENT INSTRUCTIONS
CORONARY ARTERY DISEASE:Yes known H/O MI   clinically stable. Patient is on optimal medical regimen ( see medication list above )  -     CORONARY ARTERY DISEASE: Patient is currently  asymptomatic from CAD. - changes in  treatment:   no, on ASA & Coreg.           - Testing ordered:  no  [place] classification: 1  12/2017 CATH;   1. Mild residual coronary artery disease, including Lef Main. 2. LAD stent is widely patent   3.abnormal LV function with global WMA & apical akinesis. LVEF   is estimated at 35 to 40 %. 7/1/2022   Abnormal study. Large area of apical MI, involving bentley apical, infero apical regions &    entire Septum. No significant reversible ischemia seen. LV reveals inferior septal hypokinesis with moderately reduced LV function. LVEF is 39 %. ECHO  7/14/2022   Left ventricular systolic function is mildly depressed with an ejection   fraction of 45-50%. Apical, mid and basal anteroseptal wall segments are   hypokinetic. Grade I diastolic dysfunction. No significant valvular disease noted. Normal pulmonary artery pressure. No evidence of pericardial effusion. Clinical correlation is recommended. Arrange OV  EKG; NSR, PRWP, LAD. HTN:well controlled on current medical regimen, see list above. - changes in  treatment:   no, on Lisinopril   CARDIOMYOPATHY: None known   CONGESTIVE HEART FAILURE: NO KNOWN HISTORY.      VHD: No significant VHD noted  DYSLIPIDEMIA: Patient's profile is at / near Goal.yes,                                 HDL is low                                Tolerating current medical regimen wellyes,takes Zocor                                 See most recent Lab values in Labs section above.    Diabetes mellitis: .yes,                                      Managed by family MD                                     BS under good control yes,                                      Hgb A1c avilable yes,    ARRHYTHMIAS: Recent episode of a-fib.                                Need records from [facility]                                Will restart patient on Eliquis. Atrial Fibrillation CHADSVASC2 Score Stroke Risk:   78 y.o. 74-69 - 1   male Male - 0   CHF HX: No - 0   HTN HX: Yes - 1   Stroke/TIA/Thromboembolism No - 0   Vascular Disease HX: Yes - 1   Diabetes Mellitus Yes - 1   CHADSVASC 2 Score 4      Annual Stroke Risk 4.8%- moderate-high         TESTS ORDERED: none this visit     PREVIOUSLY ORDERED TESTS REVIEWED & DISCUSSED WITH THE PATIENT:     I personally reviewed & interpreted, all previously ordered tests as copied above. Latest Labs are pulled in to the note with dates. Labs, specially in Reference to Lipid profile, Cardiac testing in the form of Echo ( dated: ), stress tests ( dated: ) & other relevant cardiac testing reviewed with patient & recommendations made based on assessment of the results. Discussed role of Cardiac risk factors & effects + treatment of co morbidities with patient & advised accordingly. MEDICATIONS: List of medications patient is currently taking is reviewed in detail with the patient. Discussed any side effects or problems taking the medication. Recommend Continue present management & medications as listed. AFFIRMATION: I spent at least 20 minutes of time reviewing patient's history, previous & current medical problems & all Labs + testing. This includes chart prep even prior to the vosit. Various goals are discussed and multiple questions answered. Relevant concelling performed. Office follow up in six months. No

## 2023-03-07 DIAGNOSIS — I10 ESSENTIAL HYPERTENSION: ICD-10-CM

## 2023-03-07 DIAGNOSIS — Z98.61 HISTORY OF PTCA: ICD-10-CM

## 2023-03-07 DIAGNOSIS — I25.10 CORONARY ARTERY DISEASE INVOLVING NATIVE CORONARY ARTERY OF NATIVE HEART WITHOUT ANGINA PECTORIS: ICD-10-CM

## 2023-03-07 RX ORDER — LISINOPRIL 10 MG/1
10 TABLET ORAL DAILY
Qty: 30 TABLET | Refills: 5 | Status: SHIPPED | OUTPATIENT
Start: 2023-03-07

## 2023-03-14 ENCOUNTER — OFFICE VISIT (OUTPATIENT)
Dept: CARDIOLOGY CLINIC | Age: 68
End: 2023-03-14
Payer: MEDICARE

## 2023-03-14 VITALS
WEIGHT: 214 LBS | SYSTOLIC BLOOD PRESSURE: 126 MMHG | BODY MASS INDEX: 30.64 KG/M2 | DIASTOLIC BLOOD PRESSURE: 64 MMHG | HEART RATE: 88 BPM | HEIGHT: 70 IN

## 2023-03-14 DIAGNOSIS — I10 ESSENTIAL HYPERTENSION: ICD-10-CM

## 2023-03-14 DIAGNOSIS — I10 PRIMARY HYPERTENSION: ICD-10-CM

## 2023-03-14 DIAGNOSIS — Z98.61 HISTORY OF PTCA: ICD-10-CM

## 2023-03-14 DIAGNOSIS — I20.9 ANGINA PECTORIS, UNSPECIFIED (HCC): ICD-10-CM

## 2023-03-14 DIAGNOSIS — I21.02 ACUTE ST ELEVATION MYOCARDIAL INFARCTION (STEMI) INVOLVING LEFT ANTERIOR DESCENDING (LAD) CORONARY ARTERY (HCC): ICD-10-CM

## 2023-03-14 DIAGNOSIS — E11.51 TYPE 2 DIABETES MELLITUS WITH DIABETIC PERIPHERAL ANGIOPATHY WITHOUT GANGRENE, WITHOUT LONG-TERM CURRENT USE OF INSULIN (HCC): ICD-10-CM

## 2023-03-14 DIAGNOSIS — I25.10 CORONARY ARTERY DISEASE INVOLVING NATIVE CORONARY ARTERY OF NATIVE HEART WITHOUT ANGINA PECTORIS: Primary | ICD-10-CM

## 2023-03-14 DIAGNOSIS — I25.119 ATHEROSCLEROSIS OF NATIVE CORONARY ARTERY OF NATIVE HEART WITH ANGINA PECTORIS (HCC): ICD-10-CM

## 2023-03-14 DIAGNOSIS — E78.2 MIXED HYPERLIPIDEMIA: ICD-10-CM

## 2023-03-14 PROCEDURE — 3078F DIAST BP <80 MM HG: CPT | Performed by: INTERNAL MEDICINE

## 2023-03-14 PROCEDURE — 99213 OFFICE O/P EST LOW 20 MIN: CPT | Performed by: INTERNAL MEDICINE

## 2023-03-14 PROCEDURE — 1123F ACP DISCUSS/DSCN MKR DOCD: CPT | Performed by: INTERNAL MEDICINE

## 2023-03-14 PROCEDURE — 3074F SYST BP LT 130 MM HG: CPT | Performed by: INTERNAL MEDICINE

## 2023-03-14 RX ORDER — SIMVASTATIN 40 MG
40 TABLET ORAL NIGHTLY
Qty: 90 TABLET | Refills: 3 | Status: SHIPPED | OUTPATIENT
Start: 2023-03-14

## 2023-03-14 RX ORDER — CARVEDILOL 6.25 MG/1
6.25 TABLET ORAL 2 TIMES DAILY WITH MEALS
Qty: 180 TABLET | Refills: 3 | Status: SHIPPED | OUTPATIENT
Start: 2023-03-14 | End: 2024-03-14

## 2023-03-14 RX ORDER — ASPIRIN 81 MG/1
81 TABLET ORAL DAILY
Qty: 30 TABLET | Refills: 5 | Status: SHIPPED | OUTPATIENT
Start: 2023-03-14

## 2023-03-14 RX ORDER — GLIMEPIRIDE 2 MG/1
2 TABLET ORAL
COMMUNITY

## 2023-03-14 RX ORDER — NITROGLYCERIN 0.4 MG/1
0.4 TABLET SUBLINGUAL EVERY 5 MIN PRN
Qty: 25 TABLET | Refills: 3 | Status: SHIPPED | OUTPATIENT
Start: 2023-03-14

## 2023-03-14 RX ORDER — LISINOPRIL 10 MG/1
10 TABLET ORAL DAILY
Qty: 30 TABLET | Refills: 5 | Status: SHIPPED | OUTPATIENT
Start: 2023-03-14

## 2023-03-14 NOTE — LETTER
March 14, 2023      Eliud Robert MD  [address]  [facility] [address]      Patient: Brent Louie   MR Number: [id number]   YOB: 1955   Date of Visit: 3/14/2023       Dear Eliud Robert: Thank you for referring An Piedra to me for evaluation/treatment. Below are the relevant portions of my assessment and plan of care. If you have questions, please do not hesitate to call me. I look forward to following Apoorva David along with you.     Sincerely,        Jessica Narvaez MD

## 2023-03-14 NOTE — PATIENT INSTRUCTIONS
CORONARY ARTERY DISEASE:Yes known H/O MI   clinically stable. Patient is on optimal medical regimen ( see medication list above )  -     CORONARY ARTERY DISEASE: Patient is currently  asymptomatic from CAD. - changes in  treatment:   no, on ASA & Coreg.           - Testing ordered:  no  [place] classification: 1  12/2017 CATH;   1. Mild residual coronary artery disease, including Lef Main. 2. LAD stent is widely patent   3.abnormal LV function with global WMA & apical akinesis. LVEF   is estimated at 35 to 40 %. 7/1/2022   Abnormal study. Large area of apical MI, involving bentley apical, infero apical regions &    entire Septum. No significant reversible ischemia seen. LV reveals inferior septal hypokinesis with moderately reduced LV function. LVEF is 39 %. ECHO  7/14/2022   Left ventricular systolic function is mildly depressed with an ejection   fraction of 45-50%. Apical, mid and basal anteroseptal wall segments are   hypokinetic. Grade I diastolic dysfunction. No significant valvular disease noted. Normal pulmonary artery pressure. No evidence of pericardial effusion. Clinical correlation is recommended. Arrange OV  EKG; NSR, PRWP, LAD. HTN:well controlled on current medical regimen, see list above. - changes in  treatment:   no, on Lisinopril   CARDIOMYOPATHY: None known   CONGESTIVE HEART FAILURE: NO KNOWN HISTORY.      VHD: No significant VHD noted  DYSLIPIDEMIA: Patient's profile is at / near Goal.yes,                                 HDL is low                                Tolerating current medical regimen wellyes,takes Zocor                                 See most recent Lab values in Labs section above.    Diabetes mellitis: .yes,                                      Managed by family MD                                     BS under good control yes,                                      Hgb A1c avilable yes,    ARRHYTHMIAS: Recent episode of a-fib.                                Need records from [facility]                                Will restart patient on Eliquis. Atrial Fibrillation CHADSVASC2 Score Stroke Risk:   78 y.o. 74-69 - 1   male Male - 0   CHF HX: No - 0   HTN HX: Yes - 1   Stroke/TIA/Thromboembolism No - 0   Vascular Disease HX: Yes - 1   Diabetes Mellitus Yes - 1   CHADSVASC 2 Score 4      Annual Stroke Risk 4.8%- moderate-high          TESTS ORDERED: none this visit. PREVIOUSLY ORDERED TESTS REVIEWED & DISCUSSED WITH THE PATIENT:     I personally reviewed & interpreted, all previously ordered tests as copied above. Latest Labs are pulled in to the note with dates. Labs, specially in Reference to Lipid profile, Cardiac testing in the form of Echo ( dated: ), stress tests ( dated: ) & other relevant cardiac testing reviewed with patient & recommendations made based on assessment of the results. Discussed role of Cardiac risk factors & effects + treatment of co morbidities with patient & advised accordingly. MEDICATIONS: List of medications patient is currently taking is reviewed in detail with the patient. Discussed any side effects or problems taking the medication. Recommend Continue present management & medications as listed. AFFIRMATION: I spent at least 20 minutes of time reviewing patient's history, previous & current medical problems & all Labs + testing. This includes chart prep even prior to the vosit. Various goals are discussed and multiple questions answered. Relevant concelling performed. Office follow up in six months.

## 2023-03-14 NOTE — PROGRESS NOTES
OFFICE PROGRESS NOTES      Apoorva David is a 80 y.o. male who has    CHIEF COMPLAINT AS FOLLOWS:  CHEST PAIN:  Patient denies any C/O chest pains at this time. SOB: No C/O SOB at this time. LEG EDEMA: No leg edema   PALPITATIONS: Denies any C/O Palpitations                                   DIZZINESS: No C/O Dizziness    SYNCOPE: None   OTHER/ ADDITIONAL COMPLAINTS:                                     HPI: Patient is here for F/U on his CAD,  Arrhythmia, HTN & Dyslipidemia problems. CAD: Patient has known CAD. Had angioplasty in the past.  Arrhythmia: Patient has known  PAF. HTN: Patient has known essential HTN. Has been treated with guideline recommended medical / physical/ diet therapy as stated below. Dyslipidemia: Patient has known mixed dyslipidemia. Has been treated with guideline recommended medical / physical/ diet therapy as stated below. Current Outpatient Medications   Medication Sig Dispense Refill    glimepiride (AMARYL) 2 MG tablet Take 2 mg by mouth every morning (before breakfast)      apixaban (ELIQUIS) 5 MG TABS tablet Take 1 tablet by mouth 2 times daily 60 tablet 5    aspirin EC 81 MG EC tablet Take 1 tablet by mouth daily 30 tablet 5    carvedilol (COREG) 6.25 MG tablet Take 1 tablet by mouth 2 times daily (with meals) 180 tablet 3    lisinopril (PRINIVIL;ZESTRIL) 10 MG tablet Take 1 tablet by mouth daily 30 tablet 5    nitroGLYCERIN (NITROSTAT) 0.4 MG SL tablet Place 1 tablet under the tongue every 5 minutes as needed for Chest pain 25 tablet 3    simvastatin (ZOCOR) 40 MG tablet Take 1 tablet by mouth nightly 90 tablet 3    gabapentin (NEURONTIN) 100 MG capsule Take 100 mg by mouth at bedtime. metFORMIN (GLUCOPHAGE-XR) 500 MG extended release tablet       OZEMPIC, 1 MG/DOSE, 4 MG/3ML SOPN INJECT 1ML ONCE A WEEK (Patient not taking: Reported on 3/14/2023)       No current facility-administered medications for this visit.      Allergies: Patient has no known allergies. Review of Systems:    Constitutional: Negative for diaphoresis and fatigue  Respiratory: Negative for shortness of breath  Cardiovascular: Negative for chest pain, dyspnea on exertion, claudication, edema, irregular heartbeat, murmur, palpitations or shortness of breath  Musculoskeletal: Negative for muscle pain, muscular weakness, negative for pain in arm and leg or swelling in foot and leg    Objective:  /64 (Site: Left Upper Arm, Position: Sitting, Cuff Size: Large Adult)   Pulse 88   Ht 5' 10\" (1.778 m)   Wt 214 lb (97.1 kg)   BMI 30.71 kg/m²   Wt Readings from Last 3 Encounters:   03/14/23 214 lb (97.1 kg)   07/15/22 185 lb (83.9 kg)   07/01/22 188 lb 9.6 oz (85.5 kg)     Body mass index is 30.71 kg/m². GENERAL - Alert, oriented, pleasant, in no apparent distress. EYES: No jaundice, no conjunctival pallor. Neck - Supple. No jugular venous distention noted. No carotid bruits. Cardiovascular - Normal S1 and S2 without obvious murmur or gallop. Extremities - No cyanosis, clubbing, or significant edema. Pulmonary - No respiratory distress. No wheezes or rales.       MEDICAL DECISION MAKING & DATA REVIEW:    Lab Review   No results found for: TROPONINT  Lab Results   Component Value Date/Time     07/14/2011 08:25 PM    BNP 4 06/22/2011 05:10 PM     Lab Results   Component Value Date    INR 0.96 12/14/2017    INR 1.13 07/14/2011     Lab Results   Component Value Date    LABA1C 6.2 06/18/2012     Lab Results   Component Value Date    WBC 6.0 07/01/2022    WBC 8.6 12/14/2017    HCT 40.2 (A) 07/01/2022    HCT 48.7 12/14/2017    MCV 86.8 07/01/2022    MCV 90.9 12/14/2017     12/14/2017     02/01/2016     Lab Results   Component Value Date    CHOL 131 02/01/2016    CHOL 151 08/05/2013    TRIG 122 02/01/2016    TRIG 157 08/05/2013    HDL 27 (L) 07/11/2022    HDL 32 (A) 02/01/2016    LDLCALC 30 07/11/2022    LDLCALC 75 02/01/2016    LDLDIRECT 113 (H) 06/23/2011     Lab Results   Component Value Date    ALT 78 07/01/2022    ALT 29 08/05/2013    AST 43 07/01/2022    AST 21 08/05/2013     BMP:    Lab Results   Component Value Date/Time     07/01/2022 12:00 AM     12/14/2017 12:25 PM    K 4.9 07/01/2022 12:00 AM    K 4.8 12/14/2017 12:25 PM    CL 95 07/01/2022 12:00 AM     12/14/2017 12:25 PM    CO2 23 07/01/2022 12:00 AM    CO2 23 12/14/2017 12:25 PM    BUN 9 07/01/2022 12:00 AM    BUN 10 12/14/2017 12:25 PM    CREATININE 0.7 07/01/2022 12:00 AM    CREATININE 0.8 12/14/2017 12:25 PM     CMP:   Lab Results   Component Value Date/Time     07/01/2022 12:00 AM     12/14/2017 12:25 PM    K 4.9 07/01/2022 12:00 AM    K 4.8 12/14/2017 12:25 PM    CL 95 07/01/2022 12:00 AM     12/14/2017 12:25 PM    CO2 23 07/01/2022 12:00 AM    CO2 23 12/14/2017 12:25 PM    BUN 9 07/01/2022 12:00 AM    BUN 10 12/14/2017 12:25 PM    CREATININE 0.7 07/01/2022 12:00 AM    CREATININE 0.8 12/14/2017 12:25 PM    PROT 6.4 07/14/2011 08:25 PM     Lab Results   Component Value Date/Time    TSH 2.190 06/18/2012 12:00 AM       QUALITY MEASURES REVIEWED:  1.CAD:Patient is taking anti platelet agent:Yes  2. DYSLIPIDEMIA: Patient is on cholesterol lowering medication:Yes  3. Beta-Blocker therapy for CAD, if prior Myocardial Infarction:Yes  4. Counselled regarding smoking cessation. No   Patient does not Smoke. 5.Anticoagulation therapy (for A.Fib) Yes   6. Discussed weight management strategies. Assessment & Plan:  Primary / Secondary prevention is the goal by aggressive risk modification, healthy and therapeutic life style changes for cardiovascular risk reduction. CORONARY ARTERY DISEASE:Yes known H/O MI   clinically stable. Patient is on optimal medical regimen ( see medication list above )  -     CORONARY ARTERY DISEASE: Patient is currently  asymptomatic from CAD.            - changes in  treatment:   no, on ASA & Coreg.           - Testing ordered:  no  [place] classification: 1  12/2017 CATH;   1. Mild residual coronary artery disease, including Lef Main. 2. LAD stent is widely patent   3.abnormal LV function with global WMA & apical akinesis. LVEF   is estimated at 35 to 40 %. 7/1/2022   Abnormal study. Large area of apical MI, involving bentley apical, infero apical regions &    entire Septum. No significant reversible ischemia seen. LV reveals inferior septal hypokinesis with moderately reduced LV function. LVEF is 39 %. ECHO  7/14/2022   Left ventricular systolic function is mildly depressed with an ejection   fraction of 45-50%. Apical, mid and basal anteroseptal wall segments are   hypokinetic. Grade I diastolic dysfunction. No significant valvular disease noted. Normal pulmonary artery pressure. No evidence of pericardial effusion. Clinical correlation is recommended. Arrange OV  EKG; NSR, PRWP, LAD. HTN:well controlled on current medical regimen, see list above. - changes in  treatment:   no, on Lisinopril   CARDIOMYOPATHY: None known   CONGESTIVE HEART FAILURE: NO KNOWN HISTORY.      VHD: No significant VHD noted  DYSLIPIDEMIA: Patient's profile is at / near Goal.yes,                                 HDL is low                                Tolerating current medical regimen wellyes,takes Zocor                                 See most recent Lab values in Labs section above. Diabetes mellitis: .yes,                                      Managed by family MD                                     BS under good control yes,                                      Hgb A1c avilable yes,    ARRHYTHMIAS: Recent episode of a-fib. Need records from [facility]                                Will restart patient on Eliquis.   Atrial Fibrillation CHADSVASC2 Score Stroke Risk:   78 y.o. 74-69 - 1   male Male - 0   CHF HX: No - 0   HTN HX: Yes - 1   Stroke/TIA/Thromboembolism No - 0   Vascular Disease HX: Yes - 1   Diabetes Mellitus Yes - 1   CHADSVASC 2 Score 4      Annual Stroke Risk 4.8%- moderate-high          TESTS ORDERED: none this visit. PREVIOUSLY ORDERED TESTS REVIEWED & DISCUSSED WITH THE PATIENT:     I personally reviewed & interpreted, all previously ordered tests as copied above. Latest Labs are pulled in to the note with dates. Labs, specially in Reference to Lipid profile, Cardiac testing in the form of Echo ( dated: ), stress tests ( dated: ) & other relevant cardiac testing reviewed with patient & recommendations made based on assessment of the results. Discussed role of Cardiac risk factors & effects + treatment of co morbidities with patient & advised accordingly. MEDICATIONS: List of medications patient is currently taking is reviewed in detail with the patient. Discussed any side effects or problems taking the medication. Recommend Continue present management & medications as listed. AFFIRMATION: I spent at least 20 minutes of time reviewing patient's history, previous & current medical problems & all Labs + testing. This includes chart prep even prior to the vosit. Various goals are discussed and multiple questions answered. Relevant concelling performed. Office follow up in six months.

## 2023-08-09 DIAGNOSIS — Z98.61 HISTORY OF PTCA: ICD-10-CM

## 2023-08-09 DIAGNOSIS — I10 ESSENTIAL HYPERTENSION: ICD-10-CM

## 2023-08-09 DIAGNOSIS — I25.10 CORONARY ARTERY DISEASE INVOLVING NATIVE CORONARY ARTERY OF NATIVE HEART WITHOUT ANGINA PECTORIS: ICD-10-CM

## 2023-08-10 RX ORDER — LISINOPRIL 10 MG/1
10 TABLET ORAL DAILY
Qty: 90 TABLET | Refills: 1 | OUTPATIENT
Start: 2023-08-10

## 2023-09-14 ENCOUNTER — OFFICE VISIT (OUTPATIENT)
Dept: CARDIOLOGY CLINIC | Age: 68
End: 2023-09-14
Payer: MEDICARE

## 2023-09-14 VITALS
SYSTOLIC BLOOD PRESSURE: 134 MMHG | WEIGHT: 210.4 LBS | HEIGHT: 70 IN | DIASTOLIC BLOOD PRESSURE: 82 MMHG | BODY MASS INDEX: 30.12 KG/M2 | HEART RATE: 83 BPM

## 2023-09-14 DIAGNOSIS — E78.2 MIXED HYPERLIPIDEMIA: ICD-10-CM

## 2023-09-14 DIAGNOSIS — E11.51 TYPE 2 DIABETES MELLITUS WITH DIABETIC PERIPHERAL ANGIOPATHY WITHOUT GANGRENE, WITHOUT LONG-TERM CURRENT USE OF INSULIN (HCC): ICD-10-CM

## 2023-09-14 DIAGNOSIS — Z98.61 HISTORY OF PTCA: ICD-10-CM

## 2023-09-14 DIAGNOSIS — I10 PRIMARY HYPERTENSION: ICD-10-CM

## 2023-09-14 DIAGNOSIS — I25.10 CORONARY ARTERY DISEASE INVOLVING NATIVE CORONARY ARTERY OF NATIVE HEART WITHOUT ANGINA PECTORIS: Primary | ICD-10-CM

## 2023-09-14 PROCEDURE — 3075F SYST BP GE 130 - 139MM HG: CPT | Performed by: INTERNAL MEDICINE

## 2023-09-14 PROCEDURE — 1123F ACP DISCUSS/DSCN MKR DOCD: CPT | Performed by: INTERNAL MEDICINE

## 2023-09-14 PROCEDURE — 99213 OFFICE O/P EST LOW 20 MIN: CPT | Performed by: INTERNAL MEDICINE

## 2023-09-14 PROCEDURE — 3079F DIAST BP 80-89 MM HG: CPT | Performed by: INTERNAL MEDICINE

## 2023-09-14 NOTE — PROGRESS NOTES
OFFICE PROGRESS NOTES      Mallika Strickland is a 80 y.o. male who has    CHIEF COMPLAINT AS FOLLOWS:  CHEST PAIN:  Patient denies any C/O chest pains at this time. SOB: No C/O SOB at this time. LEG EDEMA: No leg edema   PALPITATIONS: Denies any C/O Palpitations                                   DIZZINESS: No C/O Dizziness    SYNCOPE: None   OTHER/ ADDITIONAL COMPLAINTS:                                     HPI: Patient is here for F/U on his CAD, Arrhythmia, HTN & Dyslipidemia problems. CAD: Patient has known CAD. Had angioplasty in the past.  Arrhythmia: Patient has known  PAF. HTN: Patient has known essential HTN. Has been treated with guideline recommended medical / physical/ diet therapy as stated below. Dyslipidemia: Patient has known mixed dyslipidemia. Has been treated with guideline recommended medical / physical/ diet therapy as stated below. Current Outpatient Medications   Medication Sig Dispense Refill    glimepiride (AMARYL) 2 MG tablet Take 1 tablet by mouth every morning (before breakfast)      apixaban (ELIQUIS) 5 MG TABS tablet Take 1 tablet by mouth 2 times daily 60 tablet 5    aspirin EC 81 MG EC tablet Take 1 tablet by mouth daily 30 tablet 5    carvedilol (COREG) 6.25 MG tablet Take 1 tablet by mouth 2 times daily (with meals) 180 tablet 3    lisinopril (PRINIVIL;ZESTRIL) 10 MG tablet Take 1 tablet by mouth daily 30 tablet 5    nitroGLYCERIN (NITROSTAT) 0.4 MG SL tablet Place 1 tablet under the tongue every 5 minutes as needed for Chest pain 25 tablet 3    simvastatin (ZOCOR) 40 MG tablet Take 1 tablet by mouth nightly 90 tablet 3    gabapentin (NEURONTIN) 100 MG capsule Take 1 capsule by mouth at bedtime. metFORMIN (GLUCOPHAGE-XR) 500 MG extended release tablet       OZEMPIC, 1 MG/DOSE, 4 MG/3ML SOPN INJECT 1ML ONCE A WEEK (Patient not taking: Reported on 3/14/2023)       No current facility-administered medications for this visit.      Allergies: Patient has no

## 2023-09-14 NOTE — PATIENT INSTRUCTIONS
CORONARY ARTERY DISEASE:Yes known H/O MI   clinically stable. Patient is on optimal medical regimen ( see medication list above )  -     CORONARY ARTERY DISEASE: Patient is currently  asymptomatic from CAD. - changes in  treatment:   no, on ASA & Coreg.           - Testing ordered:  no  [place] classification: 1  12/2017 CATH;   1. Mild residual coronary artery disease, including Lef Main. 2. LAD stent is widely patent   3.abnormal LV function with global WMA & apical akinesis. LVEF   is estimated at 35 to 40 %. 7/1/2022   Abnormal study. Large area of apical MI, involving bentley apical, infero apical regions &    entire Septum. No significant reversible ischemia seen. LV reveals inferior septal hypokinesis with moderately reduced LV function. LVEF is 39 %. ECHO  7/14/2022   Left ventricular systolic function is mildly depressed with an ejection   fraction of 45-50%. Apical, mid and basal anteroseptal wall segments are   hypokinetic. Grade I diastolic dysfunction. No significant valvular disease noted. Normal pulmonary artery pressure. No evidence of pericardial effusion. Clinical correlation is recommended. Arrange OV  EKG; NSR, PRWP, LAD. HTN:well controlled on current medical regimen, see list above. - changes in  treatment:   no, on Lisinopril   CARDIOMYOPATHY: None known   CONGESTIVE HEART FAILURE: NO KNOWN HISTORY.      VHD: No significant VHD noted  DYSLIPIDEMIA: Patient's profile is at / near Goal.yes,                                 HDL is low                                Tolerating current medical regimen wellyes,takes Zocor                                 See most recent Lab values in Labs section above.    Diabetes mellitis: .yes,                                      Managed by family MD                                     BS under good control yes,                                      Hgb A1c avilable yes,    ARRHYTHMIAS: Recent episode of

## 2023-11-06 DIAGNOSIS — I10 ESSENTIAL HYPERTENSION: ICD-10-CM

## 2023-11-06 DIAGNOSIS — I25.10 CORONARY ARTERY DISEASE INVOLVING NATIVE CORONARY ARTERY OF NATIVE HEART WITHOUT ANGINA PECTORIS: ICD-10-CM

## 2023-11-06 DIAGNOSIS — Z98.61 HISTORY OF PTCA: ICD-10-CM

## 2023-11-06 RX ORDER — LISINOPRIL 10 MG/1
10 TABLET ORAL DAILY
Qty: 90 TABLET | Refills: 3 | Status: SHIPPED | OUTPATIENT
Start: 2023-11-06

## 2024-02-06 DIAGNOSIS — I25.10 CORONARY ARTERY DISEASE INVOLVING NATIVE CORONARY ARTERY OF NATIVE HEART WITHOUT ANGINA PECTORIS: ICD-10-CM

## 2024-02-06 DIAGNOSIS — I10 ESSENTIAL HYPERTENSION: ICD-10-CM

## 2024-02-06 DIAGNOSIS — Z98.61 HISTORY OF PTCA: ICD-10-CM

## 2024-02-06 RX ORDER — NITROGLYCERIN 0.4 MG/1
0.4 TABLET SUBLINGUAL EVERY 5 MIN PRN
Qty: 25 TABLET | Refills: 3 | Status: SHIPPED | OUTPATIENT
Start: 2024-02-06

## 2024-03-04 ENCOUNTER — OFFICE VISIT (OUTPATIENT)
Dept: CARDIOLOGY CLINIC | Age: 69
End: 2024-03-04
Payer: MEDICARE

## 2024-03-04 VITALS
WEIGHT: 208.8 LBS | HEART RATE: 96 BPM | SYSTOLIC BLOOD PRESSURE: 114 MMHG | BODY MASS INDEX: 29.89 KG/M2 | HEIGHT: 70 IN | DIASTOLIC BLOOD PRESSURE: 78 MMHG

## 2024-03-04 DIAGNOSIS — E78.2 MIXED HYPERLIPIDEMIA: ICD-10-CM

## 2024-03-04 DIAGNOSIS — I25.119 ATHEROSCLEROSIS OF NATIVE CORONARY ARTERY OF NATIVE HEART WITH ANGINA PECTORIS (HCC): ICD-10-CM

## 2024-03-04 DIAGNOSIS — E11.51 TYPE 2 DIABETES MELLITUS WITH DIABETIC PERIPHERAL ANGIOPATHY WITHOUT GANGRENE, WITHOUT LONG-TERM CURRENT USE OF INSULIN (HCC): ICD-10-CM

## 2024-03-04 DIAGNOSIS — I25.2 HISTORY OF ACUTE MYOCARDIAL INFARCTION: ICD-10-CM

## 2024-03-04 DIAGNOSIS — I10 PRIMARY HYPERTENSION: ICD-10-CM

## 2024-03-04 DIAGNOSIS — Z98.61 HISTORY OF PTCA: ICD-10-CM

## 2024-03-04 DIAGNOSIS — I50.22 CHRONIC SYSTOLIC (CONGESTIVE) HEART FAILURE (HCC): ICD-10-CM

## 2024-03-04 DIAGNOSIS — I10 ESSENTIAL HYPERTENSION: ICD-10-CM

## 2024-03-04 DIAGNOSIS — I25.10 CORONARY ARTERY DISEASE INVOLVING NATIVE CORONARY ARTERY OF NATIVE HEART WITHOUT ANGINA PECTORIS: Primary | ICD-10-CM

## 2024-03-04 PROCEDURE — 3078F DIAST BP <80 MM HG: CPT | Performed by: INTERNAL MEDICINE

## 2024-03-04 PROCEDURE — 1123F ACP DISCUSS/DSCN MKR DOCD: CPT | Performed by: INTERNAL MEDICINE

## 2024-03-04 PROCEDURE — 99213 OFFICE O/P EST LOW 20 MIN: CPT | Performed by: INTERNAL MEDICINE

## 2024-03-04 PROCEDURE — 3074F SYST BP LT 130 MM HG: CPT | Performed by: INTERNAL MEDICINE

## 2024-03-04 RX ORDER — LISINOPRIL 10 MG/1
10 TABLET ORAL DAILY
Qty: 90 TABLET | Refills: 3 | Status: SHIPPED | OUTPATIENT
Start: 2024-03-04

## 2024-03-04 RX ORDER — CARVEDILOL 6.25 MG/1
6.25 TABLET ORAL 2 TIMES DAILY WITH MEALS
Qty: 180 TABLET | Refills: 3 | Status: SHIPPED | OUTPATIENT
Start: 2024-03-04 | End: 2025-03-05

## 2024-03-04 RX ORDER — SIMVASTATIN 40 MG
40 TABLET ORAL NIGHTLY
Qty: 90 TABLET | Refills: 3 | Status: SHIPPED | OUTPATIENT
Start: 2024-03-04

## 2024-03-04 NOTE — PROGRESS NOTES
changes in  treatment:   no, on ASA & Coreg.           - Testing ordered:  no  [place] classification: 1  12/2017 CATH;   1. Mild residual coronary artery disease, including Lef Main.   2. LAD stent is widely patent   3.abnormal LV function with global WMA & apical akinesis. LVEF   is estimated at 35 to 40 %.     7/1/2022   Abnormal study.    Large area of apical MI, involving bentley apical, infero apical regions &    entire Septum.    No significant reversible ischemia seen.    LV reveals inferior septal hypokinesis with moderately reduced LV function.    LVEF is 39 %.      ECHO  7/14/2022   Left ventricular systolic function is mildly depressed with an ejection   fraction of 45-50%. Apical, mid and basal anteroseptal wall segments are   hypokinetic.   Grade I diastolic dysfunction.   No significant valvular disease noted.   Normal pulmonary artery pressure.   No evidence of pericardial effusion.   Clinical correlation is recommended.   Arrange OV  EKG; NSR, PRWP, LAD.     HTN:well controlled on current medical regimen, see list above.            - changes in  treatment:   no, on Lisinopril   CARDIOMYOPATHY: None known   CONGESTIVE HEART FAILURE: NO KNOWN HISTORY.      VHD: No significant VHD noted  DYSLIPIDEMIA: Patient's profile is at / near Goal.yes,                                 HDL is low                                Tolerating current medical regimen wellyes,takes Zocor                                 See most recent Lab values in Labs section above.   Diabetes mellitis: .yes,                                      Managed by family MD                                     BS under good control yes,                                      Hgb A1c avilable yes,    ARRHYTHMIAS: Recent episode of a-fib.                                Need records from [facility]                                Will restart patient on Eliquis.  Atrial Fibrillation CHADSVASC2 Score Stroke Risk:   67 y.o. 65-74 - 1   male Male -

## 2024-03-04 NOTE — PATIENT INSTRUCTIONS
CORONARY ARTERY DISEASE:Yes known H/O MI   clinically stable. Patient is on optimal medical regimen ( see medication list above )  -     CORONARY ARTERY DISEASE: Patient is currently  asymptomatic from CAD.           - changes in  treatment:   no, on ASA & Coreg.           - Testing ordered:  no  [place] classification: 1  12/2017 CATH;   1. Mild residual coronary artery disease, including Lef Main.   2. LAD stent is widely patent   3.abnormal LV function with global WMA & apical akinesis. LVEF   is estimated at 35 to 40 %.     7/1/2022   Abnormal study.    Large area of apical MI, involving bentley apical, infero apical regions &    entire Septum.    No significant reversible ischemia seen.    LV reveals inferior septal hypokinesis with moderately reduced LV function.    LVEF is 39 %.      ECHO  7/14/2022   Left ventricular systolic function is mildly depressed with an ejection   fraction of 45-50%. Apical, mid and basal anteroseptal wall segments are   hypokinetic.   Grade I diastolic dysfunction.   No significant valvular disease noted.   Normal pulmonary artery pressure.   No evidence of pericardial effusion.   Clinical correlation is recommended.   Arrange OV  EKG; NSR, PRWP, LAD.     HTN:well controlled on current medical regimen, see list above.            - changes in  treatment:   no, on Lisinopril   CARDIOMYOPATHY: None known   CONGESTIVE HEART FAILURE: NO KNOWN HISTORY.      VHD: No significant VHD noted  DYSLIPIDEMIA: Patient's profile is at / near Goal.yes,                                 HDL is low                                Tolerating current medical regimen wellyes,takes Zocor                                 See most recent Lab values in Labs section above.   Diabetes mellitis: .yes,                                      Managed by family MD                                     BS under good control yes,                                      Hgb A1c avilable yes,    ARRHYTHMIAS: Recent episode of

## 2024-09-03 ENCOUNTER — OFFICE VISIT (OUTPATIENT)
Dept: CARDIOLOGY CLINIC | Age: 69
End: 2024-09-03

## 2024-09-03 VITALS
SYSTOLIC BLOOD PRESSURE: 118 MMHG | BODY MASS INDEX: 29.75 KG/M2 | WEIGHT: 207.8 LBS | HEIGHT: 70 IN | DIASTOLIC BLOOD PRESSURE: 76 MMHG | OXYGEN SATURATION: 97 % | HEART RATE: 79 BPM

## 2024-09-03 DIAGNOSIS — I10 PRIMARY HYPERTENSION: ICD-10-CM

## 2024-09-03 DIAGNOSIS — I25.10 CORONARY ARTERY DISEASE INVOLVING NATIVE CORONARY ARTERY OF NATIVE HEART WITHOUT ANGINA PECTORIS: Primary | ICD-10-CM

## 2024-09-03 DIAGNOSIS — E78.2 MIXED HYPERLIPIDEMIA: ICD-10-CM

## 2024-09-03 DIAGNOSIS — E11.51 TYPE 2 DIABETES MELLITUS WITH DIABETIC PERIPHERAL ANGIOPATHY WITHOUT GANGRENE, WITHOUT LONG-TERM CURRENT USE OF INSULIN (HCC): ICD-10-CM

## 2024-09-03 DIAGNOSIS — Z98.61 HISTORY OF PTCA: ICD-10-CM

## 2024-09-03 DIAGNOSIS — I25.2 HISTORY OF ACUTE MYOCARDIAL INFARCTION: ICD-10-CM

## 2024-09-03 RX ORDER — DAPAGLIFLOZIN 10 MG/1
TABLET, FILM COATED ORAL
COMMUNITY
Start: 2024-08-29

## 2024-09-03 NOTE — PATIENT INSTRUCTIONS
CORONARY ARTERY DISEASE:Yes known H/O MI   clinically stable. Patient is on optimal medical regimen ( see medication list above )  -     CORONARY ARTERY DISEASE: Patient is currently  asymptomatic from CAD.           - changes in  treatment:   no, on ASA & Coreg.           - Testing ordered:  no  [place] classification: 1  12/2017 CATH;   1. Mild residual coronary artery disease, including Lef Main.   2. LAD stent is widely patent   3.abnormal LV function with global WMA & apical akinesis. LVEF   is estimated at 35 to 40 %.     7/1/2022   Abnormal study.    Large area of apical MI, involving bentley apical, infero apical regions &    entire Septum.    No significant reversible ischemia seen.    LV reveals inferior septal hypokinesis with moderately reduced LV function.    LVEF is 39 %.      ECHO  7/14/2022   Left ventricular systolic function is mildly depressed with an ejection   fraction of 45-50%. Apical, mid and basal anteroseptal wall segments are   hypokinetic.   Grade I diastolic dysfunction.   No significant valvular disease noted.   Normal pulmonary artery pressure.   No evidence of pericardial effusion.   Clinical correlation is recommended.   Arrange OV  EKG; NSR, PRWP, LAD.    EKG: NSR 75/min, Low voltage.     HTN:well controlled on current medical regimen, see list above.            - changes in  treatment:   no, on Lisinopril   CARDIOMYOPATHY: None known   CONGESTIVE HEART FAILURE: NO KNOWN HISTORY.      VHD: No significant VHD noted  DYSLIPIDEMIA: Patient's profile is at / near Goal.yes,                                 HDL is low                                Tolerating current medical regimen wellyes,takes Zocor                                 See most recent Lab values in Labs section above.   Diabetes mellitis: .yes,                                      Managed by family MD                                     BS under good control yes,                                      Hgb A1c avilable yes,

## 2024-09-03 NOTE — PROGRESS NOTES
and fatigue  Respiratory: Negative for shortness of breath  Cardiovascular: Negative for chest pain, dyspnea on exertion, claudication, edema, irregular heartbeat, murmur, palpitations or shortness of breath  Musculoskeletal: Negative for muscle pain, muscular weakness, negative for pain in arm and leg or swelling in foot and leg    Objective:  /76 (Site: Left Upper Arm, Position: Sitting, Cuff Size: Medium Adult)   Pulse 79   Ht 1.778 m (5' 10\")   Wt 94.3 kg (207 lb 12.8 oz)   SpO2 97%   BMI 29.82 kg/m²   Wt Readings from Last 3 Encounters:   09/03/24 94.3 kg (207 lb 12.8 oz)   03/04/24 94.7 kg (208 lb 12.8 oz)   09/14/23 95.4 kg (210 lb 6.4 oz)     Body mass index is 29.82 kg/m².  GENERAL - Alert, oriented, pleasant, in no apparent distress.  EYES: No jaundice, no conjunctival pallor.  Neck - Supple.  No jugular venous distention noted. No carotid bruits.   Cardiovascular - Normal S1 and S2 without obvious murmur or gallop.    Extremities - No cyanosis, clubbing, or significant edema.    Pulmonary - No respiratory distress.  No wheezes or rales.      MEDICAL DECISION MAKING & DATA REVIEW:    Lab Review   Lab Results   Component Value Date/Time    CKTOTAL 84 07/14/2011 08:25 PM    CKTOTAL 2,092 06/23/2011 11:45 AM    CKMB <0.2 07/14/2011 08:25 PM    CKMB 210.5 06/23/2011 11:45 AM     BNP:    Lab Results   Component Value Date/Time     07/14/2011 08:25 PM    BNP 4 06/22/2011 05:10 PM     PT/INR:  No results found for: \"PTINR\"  A1C:  Lab Results   Component Value Date    LABA1C 6.2 06/18/2012     Lipids:   Lab Results   Component Value Date    CHOL 131 02/01/2016    CHOL 151 08/05/2013    CHOLFAST 94 07/11/2022     Lab Results   Component Value Date    TRIG 122 02/01/2016    TRIG 157 08/05/2013    TRIGLYCFAST 185 (H) 07/11/2022     Lab Results   Component Value Date    HDL 27 (L) 07/11/2022    HDL 32 (A) 02/01/2016     Lab Results   Component Value Date    LDL 30 07/11/2022    LDL 75 02/01/2016

## 2025-03-14 NOTE — PROGRESS NOTES
Patient Name: Casey Will Jr.  : 1955  MRN# [id number]    REASON FOR VISIT: 6 month follow  Patient Active Problem List    Diagnosis Date Noted    Type 2 diabetes mellitus with diabetic peripheral angiopathy without gangrene, without long-term current use of insulin ([facility]) 2017    Angina pectoris, unspecified 2023    Atherosclerotic heart disease of native coronary artery with unspecified angina pectoris 2023    Chronic systolic (congestive) heart failure 2024    Abnormal nuclear stress test 12/15/2017    Hypertension 2013    CAD (coronary artery disease)     History of PTCA     History of acute myocardial infarction     Hyperlipidemia          LABS:  Lab Results   Component Value Date    CHOL 131 2016    TRIG 122 2016    HDL 27 (L) 2022    LDLDIRECT 113 (H) 2011    TSH 2.190 2012     Hemoglobin A1C   Date Value Ref Range Status   2012 6.2 % Final

## 2025-03-19 ENCOUNTER — OFFICE VISIT (OUTPATIENT)
Dept: CARDIOLOGY CLINIC | Age: 70
End: 2025-03-19
Payer: MEDICARE

## 2025-03-19 VITALS
DIASTOLIC BLOOD PRESSURE: 70 MMHG | BODY MASS INDEX: 29.6 KG/M2 | HEIGHT: 70 IN | SYSTOLIC BLOOD PRESSURE: 128 MMHG | HEART RATE: 87 BPM | WEIGHT: 206.8 LBS

## 2025-03-19 DIAGNOSIS — I25.10 CORONARY ARTERY DISEASE INVOLVING NATIVE CORONARY ARTERY OF NATIVE HEART WITHOUT ANGINA PECTORIS: Primary | ICD-10-CM

## 2025-03-19 DIAGNOSIS — E78.2 MIXED HYPERLIPIDEMIA: ICD-10-CM

## 2025-03-19 DIAGNOSIS — Z98.61 HISTORY OF PTCA: ICD-10-CM

## 2025-03-19 DIAGNOSIS — I10 PRIMARY HYPERTENSION: ICD-10-CM

## 2025-03-19 DIAGNOSIS — I25.2 HISTORY OF ACUTE MYOCARDIAL INFARCTION: ICD-10-CM

## 2025-03-19 DIAGNOSIS — I10 ESSENTIAL HYPERTENSION: ICD-10-CM

## 2025-03-19 PROCEDURE — 1123F ACP DISCUSS/DSCN MKR DOCD: CPT | Performed by: INTERNAL MEDICINE

## 2025-03-19 PROCEDURE — 3074F SYST BP LT 130 MM HG: CPT | Performed by: INTERNAL MEDICINE

## 2025-03-19 PROCEDURE — 3078F DIAST BP <80 MM HG: CPT | Performed by: INTERNAL MEDICINE

## 2025-03-19 PROCEDURE — 1159F MED LIST DOCD IN RCRD: CPT | Performed by: INTERNAL MEDICINE

## 2025-03-19 PROCEDURE — 93000 ELECTROCARDIOGRAM COMPLETE: CPT | Performed by: INTERNAL MEDICINE

## 2025-03-19 PROCEDURE — 1160F RVW MEDS BY RX/DR IN RCRD: CPT | Performed by: INTERNAL MEDICINE

## 2025-03-19 PROCEDURE — 99214 OFFICE O/P EST MOD 30 MIN: CPT | Performed by: INTERNAL MEDICINE

## 2025-03-19 RX ORDER — LISINOPRIL 10 MG/1
10 TABLET ORAL DAILY
Qty: 90 TABLET | Refills: 3 | Status: SHIPPED | OUTPATIENT
Start: 2025-03-19

## 2025-03-19 RX ORDER — SIMVASTATIN 40 MG
40 TABLET ORAL NIGHTLY
Qty: 90 TABLET | Refills: 3 | Status: SHIPPED | OUTPATIENT
Start: 2025-03-19

## 2025-03-19 RX ORDER — CARVEDILOL 6.25 MG/1
6.25 TABLET ORAL 2 TIMES DAILY WITH MEALS
Qty: 180 TABLET | Refills: 3 | Status: SHIPPED | OUTPATIENT
Start: 2025-03-19 | End: 2026-03-20

## 2025-03-19 RX ORDER — NITROGLYCERIN 0.4 MG/1
0.4 TABLET SUBLINGUAL EVERY 5 MIN PRN
Qty: 25 TABLET | Refills: 3 | Status: SHIPPED | OUTPATIENT
Start: 2025-03-19

## 2025-03-19 NOTE — PATIENT INSTRUCTIONS
Latest Labs are pulled in to the note with dates.   Labs, specially in Reference to Lipid profile, Cardiac testing in the form of Echo ( dated: ), stress tests ( dated: ) & other relevant cardiac testing reviewed with patient & recommendations made based on assessment of the results.    Discussed role of Cardiac risk factors & effects + treatment of co morbidities with patient & advised accordingly.     MEDICATIONS: List of medications patient is currently taking is reviewed in detail with the patient. Discussed any side effects or problems taking the medication.     Recommend Continue present management & medications as listed.     AFFIRMATION: I reviewed patient's history, previous & current medical problems & all Labs + testing. This includes chart prep even prior to the vosit. Various goals are discussed and multiple questions answered.Relevant concelling performed.     Office follow up in six months.

## 2025-03-19 NOTE — PROGRESS NOTES
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  1955  [id number]    Pt denies cardiac symptoms         When did you have your last labs drawn 07/11/2022    Do we have the labs in their chart Yes  If we do not have the labs, ask where they were drawn     If we do not have these labs, you are retrieve these labs for the provider!    Do you have a surgery or procedure scheduled in the near future - No            Check medication list thoroughly!!! AND RECONCILE OUTSIDE MEDICATIONS  If dose has changed change the entire order not just the MG  BE SURE TO ASK PATIENT IF THEY NEED MEDICATION REFILLS  Verify Pharmacy and update if incorrect    Add to every patient's \"wrap up\" the following dot phrase AFTERVISITCARDIOHEARTHOUSE and ensure we explain this to our patients

## 2025-03-19 NOTE — PROGRESS NOTES
OFFICE PROGRESS NOTES      Casey is a 70 y.o. male who has    CHIEF COMPLAINT AS FOLLOWS:  CHEST PAIN:Patient denies any C/O chest pains at this time.      SOB: No C/O SOB at this time.             LEG EDEMA: No leg edema   PALPITATIONS: Denies any C/O Palpitations                                   DIZZINESS: No C/O Dizziness    SYNCOPE: None   OTHER/ ADDITIONAL COMPLAINTS:                                     HPI: Patient is here for F/U on his CAD, Arrhythmia, HTN & Dyslipidemia problems.   CAD: Patient has known CAD. Had angioplasty in the past.  Arrhythmia: Patient has known PAF.  HTN: Patient has known essential HTN. Has been treated with guideline recommended medical / physical/ diet therapy as stated below.  Dyslipidemia: Patient has known mixed dyslipidemia. Has been treated with guideline recommended medical / physical/ diet therapy as stated below.                Current Outpatient Medications   Medication Sig Dispense Refill    Continuous Glucose Sensor (FREESTYLE CHAPARRITA 2 SENSOR) MISC       apixaban (ELIQUIS) 5 MG TABS tablet Take 1 tablet by mouth 2 times daily 60 tablet 5    simvastatin (ZOCOR) 40 MG tablet Take 1 tablet by mouth nightly 90 tablet 3    carvedilol (COREG) 6.25 MG tablet Take 1 tablet by mouth 2 times daily (with meals) 180 tablet 3    lisinopril (PRINIVIL;ZESTRIL) 10 MG tablet Take 1 tablet by mouth daily 90 tablet 3    nitroGLYCERIN (NITROSTAT) 0.4 MG SL tablet Place 1 tablet under the tongue every 5 minutes as needed for Chest pain 25 tablet 3    FARXIGA 10 MG tablet       glimepiride (AMARYL) 2 MG tablet Take 1 tablet by mouth every morning (before breakfast)      aspirin EC 81 MG EC tablet Take 1 tablet by mouth daily 30 tablet 5    gabapentin (NEURONTIN) 100 MG capsule Take 1 capsule by mouth at bedtime.      metFORMIN (GLUCOPHAGE-XR) 500 MG extended release tablet        No current facility-administered medications for this visit.     Allergies: Patient has no known

## 2025-04-04 ENCOUNTER — TELEPHONE (OUTPATIENT)
Dept: CARDIOLOGY CLINIC | Age: 70
End: 2025-04-04

## 2025-04-04 NOTE — TELEPHONE ENCOUNTER
Patient walked into office to  Eliquis samples which he called into office for. He stated during the appointment on 3/19/2025 he was told someone in the office would be reaching out to get him enrolled in a savings program that pays for his Eliquis medication.    Gave patient the sample medication that was ready for  and informed him I would place a telephone encounter to the clinical staff for someone to reach out to him regarding this program. Patient stated \"well they said someone would be reaching out after the appointment and never did\" I apologized and again stated I was placing an encounter into his chart to which someone would have to respond and get back to him regarding this.

## 2025-04-15 NOTE — TELEPHONE ENCOUNTER
Called pt to confirm if he had been given paperwork to complete for Eliquis assistance. Pt stated that he had not been given any paperwork. He asked if it could be mailed to him. I advised that packet would be mailed to him and once he completes his portion to bring it back to the office. Pt voiced understanding.

## 2025-05-02 ENCOUNTER — TELEPHONE (OUTPATIENT)
Dept: CARDIOLOGY CLINIC | Age: 70
End: 2025-05-02

## 2025-05-02 NOTE — TELEPHONE ENCOUNTER
2025 Patient assistance application complete, scanned into [employer] and faxed to [employer] for Eliquis.  Awaiting response.

## 2025-08-18 ENCOUNTER — HOSPITAL ENCOUNTER (OUTPATIENT)
Dept: LAB | Age: 70
Discharge: HOME | End: 2025-08-18
Payer: MEDICARE

## 2025-08-18 LAB
ALBUMIN SERPL-MCNC: 3.9 G/DL (ref 3.4–5)
ALBUMIN/GLOB SERPL: 1 {RATIO} (ref 1.1–2.2)
ALP SERPL-CCNC: 82 U/L (ref 40–129)
ALT SERPL-CCNC: 106 U/L (ref 10–40)
ANION GAP SERPL CALCULATED.3IONS-SCNC: 11 MMOL/L (ref 9–17)
AST SERPL-CCNC: 34 U/L (ref 15–37)
BASOPHILS # BLD: 0.06 K/UL
BASOPHILS NFR BLD: 1 % (ref 0–1)
BILIRUB SERPL-MCNC: 0.4 MG/DL (ref 0–1)
BILIRUB UR QL STRIP: NEGATIVE
BUN SERPL-MCNC: 17 MG/DL (ref 7–20)
CALCIUM SERPL-MCNC: 10.1 MG/DL (ref 8.3–10.6)
CHLORIDE SERPL-SCNC: 99 MMOL/L (ref 99–110)
CHOLEST SERPL-MCNC: 156 MG/DL (ref 125–199)
CLARITY UR: CLEAR
CO2 SERPL-SCNC: 25 MMOL/L (ref 21–32)
COLOR UR: YELLOW
CREAT SERPL-MCNC: 0.9 MG/DL (ref 0.8–1.3)
CREAT UR-MCNC: 59.9 MG/DL (ref 39–259)
EOSINOPHIL # BLD: 0.16 K/UL
EOSINOPHILS RELATIVE PERCENT: 2 % (ref 0–3)
ERYTHROCYTE [DISTWIDTH] IN BLOOD BY AUTOMATED COUNT: 13.2 % (ref 11.7–14.9)
EST. AVERAGE GLUCOSE BLD GHB EST-MCNC: 207 MG/DL
GFR, ESTIMATED: 81 ML/MIN/1.73M2
GLUCOSE SERPL-MCNC: 128 MG/DL (ref 74–99)
GLUCOSE UR STRIP-MCNC: >=1000 MG/DL
HBA1C MFR BLD: 8.8 % (ref 4.2–6.3)
HCT VFR BLD AUTO: 48.6 % (ref 42–52)
HDLC SERPL-MCNC: 26 MG/DL
HGB BLD-MCNC: 15.8 G/DL (ref 13.5–18)
HGB UR QL STRIP.AUTO: ABNORMAL
IMM GRANULOCYTES # BLD AUTO: 0.08 K/UL
IMM GRANULOCYTES NFR BLD: 1 %
KETONES UR STRIP-MCNC: NEGATIVE MG/DL
LDLC SERPL CALC-MCNC: 90 MG/DL
LEUKOCYTE ESTERASE UR QL STRIP: NEGATIVE
LYMPHOCYTES NFR BLD: 2.18 K/UL
LYMPHOCYTES RELATIVE PERCENT: 25 % (ref 24–44)
MCH RBC QN AUTO: 29.9 PG (ref 27–31)
MCHC RBC AUTO-ENTMCNC: 32.5 G/DL (ref 32–36)
MCV RBC AUTO: 92 FL (ref 78–100)
MICROALBUMIN UR-MCNC: 27 MG/L (ref 0–20)
MICROALBUMIN/CREAT UR-RTO: 45 MCG/MG CREAT
MONOCYTES NFR BLD: 0.79 K/UL
MONOCYTES NFR BLD: 9 % (ref 0–5)
NEUTROPHILS NFR BLD: 63 % (ref 36–66)
NEUTS SEG NFR BLD: 5.62 K/UL
NITRITE UR QL STRIP: NEGATIVE
PH UR STRIP: 6 [PH] (ref 5–8)
PLATELET # BLD AUTO: 427 K/UL (ref 140–440)
PMV BLD AUTO: 10.3 FL (ref 7.5–11.1)
POTASSIUM SERPL-SCNC: 4.6 MMOL/L (ref 3.5–5.1)
PROT SERPL-MCNC: 7.8 G/DL (ref 6.4–8.2)
PROT UR STRIP-MCNC: NEGATIVE MG/DL
PSA SERPL-MCNC: 1.12 NG/ML (ref 0–4)
RBC # BLD AUTO: 5.28 M/UL (ref 4.6–6.2)
RBC #/AREA URNS HPF: 1 /HPF (ref 0–2)
SODIUM SERPL-SCNC: 135 MMOL/L (ref 136–145)
SP GR UR STRIP: 1.01 (ref 1–1.03)
TRIGL SERPL-MCNC: 199 MG/DL
UROBILINOGEN UR STRIP-ACNC: 0.2 EU/DL (ref 0–1)
WBC #/AREA URNS HPF: 1 /HPF (ref 0–5)
WBC OTHER # BLD: 8.9 K/UL (ref 4–10.5)

## 2025-08-18 PROCEDURE — 81001 URINALYSIS AUTO W/SCOPE: CPT

## 2025-08-18 PROCEDURE — G0103 PSA SCREENING: HCPCS

## 2025-08-18 PROCEDURE — 80053 COMPREHEN METABOLIC PANEL: CPT

## 2025-08-18 PROCEDURE — 82570 ASSAY OF URINE CREATININE: CPT

## 2025-08-18 PROCEDURE — 83036 HEMOGLOBIN GLYCOSYLATED A1C: CPT

## 2025-08-18 PROCEDURE — 80061 LIPID PANEL: CPT

## 2025-08-18 PROCEDURE — 82043 UR ALBUMIN QUANTITATIVE: CPT

## 2025-08-18 PROCEDURE — 85025 COMPLETE CBC W/AUTO DIFF WBC: CPT
